# Patient Record
Sex: FEMALE | Race: BLACK OR AFRICAN AMERICAN | Employment: UNEMPLOYED | URBAN - METROPOLITAN AREA
[De-identification: names, ages, dates, MRNs, and addresses within clinical notes are randomized per-mention and may not be internally consistent; named-entity substitution may affect disease eponyms.]

---

## 2018-05-08 ENCOUNTER — OFFICE VISIT (OUTPATIENT)
Dept: INTERNAL MEDICINE CLINIC | Age: 23
End: 2018-05-08

## 2018-05-08 VITALS
SYSTOLIC BLOOD PRESSURE: 100 MMHG | HEIGHT: 66 IN | HEART RATE: 76 BPM | WEIGHT: 230.2 LBS | BODY MASS INDEX: 37 KG/M2 | DIASTOLIC BLOOD PRESSURE: 72 MMHG | TEMPERATURE: 98.2 F

## 2018-05-08 DIAGNOSIS — E11.9 TYPE 2 DIABETES MELLITUS WITHOUT COMPLICATION, WITHOUT LONG-TERM CURRENT USE OF INSULIN (HCC): ICD-10-CM

## 2018-05-08 DIAGNOSIS — R62.50 DEVELOPMENTAL DELAY: ICD-10-CM

## 2018-05-08 DIAGNOSIS — R73.09 ELEVATED GLUCOSE: ICD-10-CM

## 2018-05-08 DIAGNOSIS — I63.10 CEREBROVASCULAR ACCIDENT (CVA) DUE TO EMBOLISM OF PRECEREBRAL ARTERY (HCC): Primary | ICD-10-CM

## 2018-05-08 DIAGNOSIS — Q21.10 ASD (ATRIAL SEPTAL DEFECT): ICD-10-CM

## 2018-05-08 DIAGNOSIS — I47.1 ATRIAL TACHYCARDIA (HCC): ICD-10-CM

## 2018-05-08 DIAGNOSIS — Z13.820 SCREENING FOR OSTEOPOROSIS: ICD-10-CM

## 2018-05-08 DIAGNOSIS — K74.00 LIVER FIBROSIS: ICD-10-CM

## 2018-05-08 DIAGNOSIS — G43.509 PERSISTENT MIGRAINE AURA WITHOUT CEREBRAL INFARCTION AND WITHOUT STATUS MIGRAINOSUS, NOT INTRACTABLE: ICD-10-CM

## 2018-05-08 PROBLEM — I47.19 ATRIAL TACHYCARDIA: Status: ACTIVE | Noted: 2018-05-08

## 2018-05-08 LAB — HBA1C MFR BLD: 8.8 %

## 2018-05-08 PROCEDURE — 99204 OFFICE O/P NEW MOD 45 MIN: CPT | Performed by: INTERNAL MEDICINE

## 2018-05-08 PROCEDURE — 83036 HEMOGLOBIN GLYCOSYLATED A1C: CPT | Performed by: INTERNAL MEDICINE

## 2018-05-08 RX ORDER — PANTOPRAZOLE SODIUM 40 MG/1
TABLET, DELAYED RELEASE ORAL
COMMUNITY
Start: 2018-01-16 | End: 2021-05-28 | Stop reason: SDUPTHER

## 2018-05-08 RX ORDER — LISINOPRIL 5 MG/1
5 TABLET ORAL
COMMUNITY
Start: 2018-01-11 | End: 2018-08-07 | Stop reason: ALTCHOICE

## 2018-05-08 RX ORDER — WARFARIN SODIUM 1 MG/1
6.5 TABLET ORAL DAILY
COMMUNITY
Start: 2018-01-22 | End: 2021-03-09 | Stop reason: SDUPTHER

## 2018-05-08 RX ORDER — METOPROLOL SUCCINATE 25 MG/1
25 TABLET, EXTENDED RELEASE ORAL
COMMUNITY
Start: 2018-01-05 | End: 2021-03-09

## 2018-05-08 RX ORDER — MEDROXYPROGESTERONE ACETATE 150 MG/ML
INJECTION, SUSPENSION INTRAMUSCULAR
COMMUNITY
End: 2018-05-11 | Stop reason: SDUPTHER

## 2018-05-08 RX ORDER — PROPAFENONE HYDROCHLORIDE 225 MG/1
TABLET, FILM COATED ORAL
COMMUNITY
Start: 2018-04-16

## 2018-05-08 RX ORDER — NYSTATIN 100000 [USP'U]/G
POWDER TOPICAL
COMMUNITY
Start: 2017-05-05 | End: 2018-12-13 | Stop reason: SDUPTHER

## 2018-05-08 RX ORDER — SPIRONOLACTONE 25 MG/1
25 TABLET ORAL DAILY
COMMUNITY
Start: 2017-05-05 | End: 2021-03-09 | Stop reason: ALTCHOICE

## 2018-05-08 RX ORDER — AMITRIPTYLINE HYDROCHLORIDE 25 MG/1
50 TABLET, FILM COATED ORAL
COMMUNITY
Start: 2018-04-19 | End: 2019-11-05

## 2018-05-08 ASSESSMENT — ENCOUNTER SYMPTOMS
WHEEZING: 0
CHEST TIGHTNESS: 0
SHORTNESS OF BREATH: 0

## 2018-05-09 LAB
A/G RATIO: 1.6 (ref 1.1–2.2)
ALBUMIN SERPL-MCNC: 4.5 G/DL (ref 3.4–5)
ALP BLD-CCNC: 85 U/L (ref 40–129)
ALT SERPL-CCNC: 28 U/L (ref 10–40)
ANION GAP SERPL CALCULATED.3IONS-SCNC: 19 MMOL/L (ref 3–16)
AST SERPL-CCNC: 23 U/L (ref 15–37)
BILIRUB SERPL-MCNC: 0.4 MG/DL (ref 0–1)
BUN BLDV-MCNC: 14 MG/DL (ref 7–20)
CALCIUM SERPL-MCNC: 9.5 MG/DL (ref 8.3–10.6)
CHLORIDE BLD-SCNC: 95 MMOL/L (ref 99–110)
CHOLESTEROL, TOTAL: 250 MG/DL (ref 0–199)
CO2: 22 MMOL/L (ref 21–32)
CREAT SERPL-MCNC: 0.8 MG/DL (ref 0.6–1.1)
GFR AFRICAN AMERICAN: >60
GFR NON-AFRICAN AMERICAN: >60
GLOBULIN: 2.8 G/DL
GLUCOSE BLD-MCNC: 150 MG/DL (ref 70–99)
HDLC SERPL-MCNC: 32 MG/DL (ref 40–60)
LDL CHOLESTEROL CALCULATED: 180 MG/DL
POTASSIUM SERPL-SCNC: 5.2 MMOL/L (ref 3.5–5.1)
SODIUM BLD-SCNC: 136 MMOL/L (ref 136–145)
TOTAL PROTEIN: 7.3 G/DL (ref 6.4–8.2)
TRIGL SERPL-MCNC: 190 MG/DL (ref 0–150)
TSH REFLEX: 2.72 UIU/ML (ref 0.27–4.2)
VLDLC SERPL CALC-MCNC: 38 MG/DL

## 2018-05-11 RX ORDER — MEDROXYPROGESTERONE ACETATE 150 MG/ML
150 INJECTION, SUSPENSION INTRAMUSCULAR
Qty: 1 ML | Refills: 1 | Status: SHIPPED | OUTPATIENT
Start: 2018-05-11 | End: 2018-07-17 | Stop reason: SDUPTHER

## 2018-05-15 ENCOUNTER — TELEPHONE (OUTPATIENT)
Dept: INTERNAL MEDICINE CLINIC | Age: 23
End: 2018-05-15

## 2018-05-15 DIAGNOSIS — E11.9 TYPE 2 DIABETES MELLITUS WITHOUT COMPLICATION, WITHOUT LONG-TERM CURRENT USE OF INSULIN (HCC): Primary | ICD-10-CM

## 2018-05-15 LAB — POTASSIUM SERPL-SCNC: 4.8 MMOL/L (ref 3.5–5.1)

## 2018-05-25 ENCOUNTER — TELEPHONE (OUTPATIENT)
Dept: INTERNAL MEDICINE CLINIC | Age: 23
End: 2018-05-25

## 2018-05-31 ENCOUNTER — HOSPITAL ENCOUNTER (OUTPATIENT)
Dept: WOMENS IMAGING | Age: 23
Discharge: HOME | End: 2018-05-31
Attending: INTERNAL MEDICINE | Admitting: INTERNAL MEDICINE

## 2018-05-31 DIAGNOSIS — Z13.820 ENCOUNTER FOR SCREENING FOR OSTEOPOROSIS: ICD-10-CM

## 2018-06-01 ENCOUNTER — HOSPITAL ENCOUNTER (OUTPATIENT)
Dept: OTHER | Age: 23
Discharge: HOME | End: 2018-06-30
Attending: INTERNAL MEDICINE | Admitting: INTERNAL MEDICINE

## 2018-06-04 PROBLEM — M85.80 LOW BONE MASS: Status: ACTIVE | Noted: 2018-06-04

## 2018-06-06 ENCOUNTER — TELEPHONE (OUTPATIENT)
Dept: INTERNAL MEDICINE CLINIC | Age: 23
End: 2018-06-06

## 2018-06-12 ENCOUNTER — TELEPHONE (OUTPATIENT)
Dept: INTERNAL MEDICINE CLINIC | Age: 23
End: 2018-06-12

## 2018-06-13 RX ORDER — ATORVASTATIN CALCIUM 20 MG/1
20 TABLET, FILM COATED ORAL DAILY
Qty: 30 TABLET | Refills: 3 | Status: SHIPPED | OUTPATIENT
Start: 2018-06-13 | End: 2018-10-12 | Stop reason: SDUPTHER

## 2018-06-26 ENCOUNTER — TELEPHONE (OUTPATIENT)
Dept: INTERNAL MEDICINE CLINIC | Age: 23
End: 2018-06-26

## 2018-06-26 RX ORDER — PROMETHAZINE HYDROCHLORIDE 25 MG/1
25 TABLET ORAL EVERY 6 HOURS PRN
Qty: 12 TABLET | Refills: 1 | Status: SHIPPED | OUTPATIENT
Start: 2018-06-26 | End: 2018-07-03

## 2018-07-17 RX ORDER — MEDROXYPROGESTERONE ACETATE 150 MG/ML
INJECTION, SUSPENSION INTRAMUSCULAR
Qty: 1 ML | Refills: 3 | Status: SHIPPED | OUTPATIENT
Start: 2018-07-17 | End: 2018-11-14 | Stop reason: ALTCHOICE

## 2018-08-07 ENCOUNTER — OFFICE VISIT (OUTPATIENT)
Dept: INTERNAL MEDICINE CLINIC | Age: 23
End: 2018-08-07

## 2018-08-07 VITALS
TEMPERATURE: 98.1 F | HEIGHT: 66 IN | DIASTOLIC BLOOD PRESSURE: 70 MMHG | BODY MASS INDEX: 37.16 KG/M2 | WEIGHT: 231.2 LBS | HEART RATE: 67 BPM | SYSTOLIC BLOOD PRESSURE: 90 MMHG

## 2018-08-07 DIAGNOSIS — K74.00 LIVER FIBROSIS: ICD-10-CM

## 2018-08-07 DIAGNOSIS — M85.80 LOW BONE MASS: ICD-10-CM

## 2018-08-07 DIAGNOSIS — E11.9 TYPE 2 DIABETES MELLITUS WITHOUT COMPLICATION, WITHOUT LONG-TERM CURRENT USE OF INSULIN (HCC): ICD-10-CM

## 2018-08-07 DIAGNOSIS — Z00.00 ANNUAL PHYSICAL EXAM: Primary | ICD-10-CM

## 2018-08-07 LAB — HBA1C MFR BLD: 6.7 %

## 2018-08-07 PROCEDURE — 99395 PREV VISIT EST AGE 18-39: CPT | Performed by: INTERNAL MEDICINE

## 2018-08-07 PROCEDURE — 83036 HEMOGLOBIN GLYCOSYLATED A1C: CPT | Performed by: INTERNAL MEDICINE

## 2018-08-07 NOTE — PROGRESS NOTES
normal.   Mouth/Throat: No oropharyngeal exudate (scalloped tongue). Neck: No thyromegaly present. Cardiovascular: Normal rate, regular rhythm and intact distal pulses. Murmur (2/6 REDD LSB) heard. Pulmonary/Chest: Effort normal and breath sounds normal. No respiratory distress. She has no wheezes. She has no rales. Abdominal: She exhibits no distension. There is no tenderness. There is no rebound and no guarding. Genitourinary: There is no rash, tenderness, lesion or injury on the right labia. There is no rash, tenderness, lesion or injury on the left labia. Genitourinary Comments: I tried to do speculum exam  Patient pulled away and could not insert even the pediatric speculum     Musculoskeletal: She exhibits no edema. Lymphadenopathy:     She has no cervical adenopathy. Neurological: She is alert and oriented to person, place, and time. Skin: Skin is warm and dry. Assessment:          Diagnosis Orders   1. Annual physical exam     2. Type 2 diabetes mellitus without complication, without long-term current use of insulin ([facility])  POCT glycosylated hemoglobin (Hb A1C)   3. Liver fibrosis ([address]     4.  Low bone mass             Plan:       will forgo pap smear    If cycles become heavy consider IUD  She has not yet resumed her cycles    A 1 c better 6.7   Continue metformin    She sees  for liver disease    The depo discontinued due to low bone mass and diabetes    Return 3 months

## 2018-10-12 RX ORDER — ATORVASTATIN CALCIUM 20 MG/1
TABLET, FILM COATED ORAL
Qty: 30 TABLET | Refills: 2 | Status: SHIPPED | OUTPATIENT
Start: 2018-10-12 | End: 2019-01-16 | Stop reason: SDUPTHER

## 2018-11-14 ENCOUNTER — OFFICE VISIT (OUTPATIENT)
Dept: PRIMARY CARE CLINIC | Age: 23
End: 2018-11-14

## 2018-11-14 VITALS
SYSTOLIC BLOOD PRESSURE: 110 MMHG | BODY MASS INDEX: 37.65 KG/M2 | OXYGEN SATURATION: 98 % | HEART RATE: 88 BPM | DIASTOLIC BLOOD PRESSURE: 76 MMHG | HEIGHT: 65 IN | WEIGHT: 226 LBS | TEMPERATURE: 98.2 F

## 2018-11-14 DIAGNOSIS — Q21.10 ASD (ATRIAL SEPTAL DEFECT): ICD-10-CM

## 2018-11-14 DIAGNOSIS — E11.9 TYPE 2 DIABETES MELLITUS WITHOUT COMPLICATION, WITHOUT LONG-TERM CURRENT USE OF INSULIN (HCC): Primary | ICD-10-CM

## 2018-11-14 DIAGNOSIS — M25.552 HIP PAIN, LEFT: ICD-10-CM

## 2018-11-14 DIAGNOSIS — M85.80 LOW BONE MASS: ICD-10-CM

## 2018-11-14 DIAGNOSIS — K74.00 LIVER FIBROSIS: ICD-10-CM

## 2018-11-14 LAB
ANION GAP SERPL CALCULATED.3IONS-SCNC: 17 MMOL/L (ref 3–16)
BUN BLDV-MCNC: 12 MG/DL (ref 7–20)
CALCIUM SERPL-MCNC: 10.2 MG/DL (ref 8.3–10.6)
CHLORIDE BLD-SCNC: 101 MMOL/L (ref 99–110)
CHOLESTEROL, TOTAL: 152 MG/DL (ref 0–199)
CO2: 20 MMOL/L (ref 21–32)
CREAT SERPL-MCNC: 0.8 MG/DL (ref 0.6–1.1)
GFR AFRICAN AMERICAN: >60
GFR NON-AFRICAN AMERICAN: >60
GLUCOSE BLD-MCNC: 83 MG/DL (ref 70–99)
HBA1C MFR BLD: 6.9 %
HDLC SERPL-MCNC: 35 MG/DL (ref 40–60)
LDL CHOLESTEROL CALCULATED: 81 MG/DL
POTASSIUM SERPL-SCNC: 4.5 MMOL/L (ref 3.5–5.1)
SODIUM BLD-SCNC: 138 MMOL/L (ref 136–145)
TRIGL SERPL-MCNC: 180 MG/DL (ref 0–150)
VLDLC SERPL CALC-MCNC: 36 MG/DL

## 2018-11-14 PROCEDURE — 83036 HEMOGLOBIN GLYCOSYLATED A1C: CPT | Performed by: INTERNAL MEDICINE

## 2018-11-14 PROCEDURE — 99213 OFFICE O/P EST LOW 20 MIN: CPT | Performed by: INTERNAL MEDICINE

## 2018-11-14 ASSESSMENT — PATIENT HEALTH QUESTIONNAIRE - PHQ9
SUM OF ALL RESPONSES TO PHQ QUESTIONS 1-9: 0
SUM OF ALL RESPONSES TO PHQ QUESTIONS 1-9: 0
SUM OF ALL RESPONSES TO PHQ9 QUESTIONS 1 & 2: 0
2. FEELING DOWN, DEPRESSED OR HOPELESS: 0
1. LITTLE INTEREST OR PLEASURE IN DOING THINGS: 0

## 2018-12-13 RX ORDER — NYSTATIN 100000 [USP'U]/G
POWDER TOPICAL
Qty: 2 BOTTLE | Refills: 1 | Status: SHIPPED | OUTPATIENT
Start: 2018-12-13 | End: 2019-04-01 | Stop reason: SDUPTHER

## 2019-01-17 RX ORDER — ATORVASTATIN CALCIUM 20 MG/1
TABLET, FILM COATED ORAL
Qty: 90 TABLET | Refills: 1 | Status: SHIPPED | OUTPATIENT
Start: 2019-01-17 | End: 2019-07-19 | Stop reason: SDUPTHER

## 2019-02-06 ENCOUNTER — TELEPHONE (OUTPATIENT)
Dept: PRIMARY CARE CLINIC | Age: 24
End: 2019-02-06

## 2019-02-22 ENCOUNTER — TELEPHONE (OUTPATIENT)
Dept: PRIMARY CARE CLINIC | Age: 24
End: 2019-02-22

## 2019-02-25 RX ORDER — PROCHLORPERAZINE MALEATE 10 MG
5 TABLET ORAL PRN
COMMUNITY
Start: 2016-09-30 | End: 2022-05-31

## 2019-02-27 DIAGNOSIS — M85.80 LOW BONE MASS: Primary | ICD-10-CM

## 2019-03-29 ENCOUNTER — TELEPHONE (OUTPATIENT)
Dept: PRIMARY CARE CLINIC | Age: 24
End: 2019-03-29

## 2019-03-29 RX ORDER — NYSTATIN 100000 [USP'U]/G
POWDER TOPICAL
Qty: 2 BOTTLE | Refills: 1 | Status: CANCELLED | OUTPATIENT
Start: 2019-03-29

## 2019-04-01 ENCOUNTER — TELEPHONE (OUTPATIENT)
Dept: PRIMARY CARE CLINIC | Age: 24
End: 2019-04-01

## 2019-04-01 RX ORDER — NYSTATIN 100000 [USP'U]/G
POWDER TOPICAL
Qty: 2 BOTTLE | Refills: 1 | Status: SHIPPED | OUTPATIENT
Start: 2019-04-01 | End: 2020-01-30 | Stop reason: SDUPTHER

## 2019-04-09 ENCOUNTER — OFFICE VISIT (OUTPATIENT)
Dept: PRIMARY CARE CLINIC | Age: 24
End: 2019-04-09
Payer: COMMERCIAL

## 2019-04-09 VITALS
WEIGHT: 225 LBS | BODY MASS INDEX: 37.44 KG/M2 | HEART RATE: 76 BPM | SYSTOLIC BLOOD PRESSURE: 106 MMHG | TEMPERATURE: 97.6 F | DIASTOLIC BLOOD PRESSURE: 74 MMHG

## 2019-04-09 DIAGNOSIS — R21 RASH: ICD-10-CM

## 2019-04-09 DIAGNOSIS — Q21.10 ASD (ATRIAL SEPTAL DEFECT): ICD-10-CM

## 2019-04-09 DIAGNOSIS — E11.9 TYPE 2 DIABETES MELLITUS WITHOUT COMPLICATION, WITHOUT LONG-TERM CURRENT USE OF INSULIN (HCC): Primary | ICD-10-CM

## 2019-04-09 DIAGNOSIS — I63.10 CEREBROVASCULAR ACCIDENT (CVA) DUE TO EMBOLISM OF PRECEREBRAL ARTERY (HCC): ICD-10-CM

## 2019-04-09 DIAGNOSIS — E78.5 HYPERLIPIDEMIA, UNSPECIFIED HYPERLIPIDEMIA TYPE: ICD-10-CM

## 2019-04-09 LAB — HBA1C MFR BLD: 6.4 %

## 2019-04-09 PROCEDURE — 83036 HEMOGLOBIN GLYCOSYLATED A1C: CPT | Performed by: INTERNAL MEDICINE

## 2019-04-09 PROCEDURE — G8427 DOCREV CUR MEDS BY ELIG CLIN: HCPCS | Performed by: INTERNAL MEDICINE

## 2019-04-09 PROCEDURE — 1036F TOBACCO NON-USER: CPT | Performed by: INTERNAL MEDICINE

## 2019-04-09 PROCEDURE — 3046F HEMOGLOBIN A1C LEVEL >9.0%: CPT | Performed by: INTERNAL MEDICINE

## 2019-04-09 PROCEDURE — G8417 CALC BMI ABV UP PARAM F/U: HCPCS | Performed by: INTERNAL MEDICINE

## 2019-04-09 PROCEDURE — 2022F DILAT RTA XM EVC RTNOPTHY: CPT | Performed by: INTERNAL MEDICINE

## 2019-04-09 PROCEDURE — 99214 OFFICE O/P EST MOD 30 MIN: CPT | Performed by: INTERNAL MEDICINE

## 2019-04-09 PROCEDURE — G8598 ASA/ANTIPLAT THER USED: HCPCS | Performed by: INTERNAL MEDICINE

## 2019-04-09 NOTE — PROGRESS NOTES
Shakeel Talley  YOB: 1995    Date of Service:  4/9/2019    Chief Complaint:   Shakeel Talley is a 21 y.o. female who presents for  Breast rash and chronic health problems. HPI: patient came here for Breast rash and chronic health problems. Diabetes: stable . Monitoring sugar at home. patient is on metformin . Discussed for low carb diet and good impact of exercise on health. Hyperlipidemia : stable on lipitor. patient denies any side effects of medication . ASD: [facility] is [facility] .      CVA: h/o CVA . Patient  complaints rash under breasts. She was on anti fungal power . Review of Systems:  Constitutional: Negative for chills, fever, malaise/fatigue and weight loss. HENT: Negative for headaches, ear discharge, ear pain, hearing loss, sore throat,   blurry vision and double vision. Respiratory: Negative for cough, hemoptysis, sputum production, shortness of breath and wheezing. Cardiovascular: Negative for chest pain, palpitations, orthopnea, claudication and leg swelling. Gastrointestinal: Negative for abdominal pain, nausea and vomiting, constipation, diarrhea, heartburn, blood in stool, melena. Genitourinary: Negative for dysuria, flank pain, frequency, hematuria and urgency. Musculoskeletal: Negative for back pain, myalgias and neck pain. Neurological: Negative for dizziness, seizure, sensory change, focal weakness, loss of consciousness . Psychiatric/Behavioral: Negative for depression and substance abuse. The patient does not have insomnia. Vitals:    04/09/19 1406   BP: 106/74   Pulse: 76   Temp: 97.6 °F (36.4 °C)     Wt Readings from Last 3 Encounters:   04/09/19 225 lb (102.1 kg)   11/14/18 226 lb (102.5 kg)   08/07/18 231 lb 3.2 oz (104.9 kg)     BP Readings from Last 3 Encounters:   04/09/19 106/74   11/14/18 110/76   08/07/18 90/70         Physical Exam:  Constitutional:   .  No distress. HENT:   Head: Normocephalic. Right Ear: External ear normal.   Left Ear: External ear normal.   Mouth/Throat: Oropharynx is clear and moist. No oropharyngeal exudate. Eyes: Conjunctivae and EOM are normal. Pupils are equal, round, and reactive to light. Right eye exhibits no discharge. Left eye exhibits no discharge. No scleral icterus. Neck: Neck supple. No JVD present. No thyromegaly present. Cardiovascular:  Normal rate, regular rhythm, normal heart sounds and intact distal pulses. Exam reveals no gallop. murmur heard+. no edema. Chest: Effort normal and breath sounds normal. no wheezes. has no rales. Abdominal: old scars+,Soft, Bowel sounds are normal.  exhibits no distension and no mass. No organomegaly  There is no tenderness. There is no guarding. Musculoskeletal: Normal range of motion in all extremities. Lymphadenopathy: No cervical adenopathy. Neurological:  exhibits normal muscle tone. Skin: Rash under both breasts+ . Psychiatric:  alert and oriented to person, place, and time. Normal mood and affect. Immunization History   Administered Date(s) Administered    Influenza Virus Vaccine 11/14/2018       Allergies   Allergen Reactions    Acetaminophen Other (See Comments)     TYLENOL d/t HEPATIC Dz     Current Outpatient Medications   Medication Sig Dispense Refill    nystatin (MYCOSTATIN) 697718 UNIT/GM powder Apply a small amount to the affected area.  2 Bottle 1    prochlorperazine (COMPAZINE) 10 MG tablet Take 5 mg by mouth      atorvastatin (LIPITOR) 20 MG tablet TAKE ONE TABLET BY MOUTH DAILY 90 tablet 1    metFORMIN (GLUCOPHAGE) 1000 MG tablet TAKE ONE TABLET BY MOUTH TWICE A DAY WITH MEALS 60 tablet 1    amitriptyline (ELAVIL) 25 MG tablet Take 50 mg by mouth      metoprolol succinate (TOPROL XL) 25 MG extended release tablet Take 25 mg by mouth      pantoprazole (PROTONIX) 40 MG tablet TAKE ONE TABLET BY MOUTH DAILY      file     Forced sexual activity: Not on file   Other Topics Concern    Not on file   Social History Narrative    Not on file       Assessment/Plan:    Type 2 diabetes mellitus without complication, without long-term current use of insulin ([facility])  Stable  Metformin   Monitor blood sugar at home  Regular exercise and low carb diet.   Eye and foot check        Rash  nystatin powder       Hyperlipidemia, unspecified hyperlipidemia type  Stable  lipitor     ASD (atrial septal defect)  Patient is following  [facility] cardiologist .    Cerebrovascular accident (CVA) due to embolism of precerebral artery [facility])  Patient is following  neurology

## 2019-04-10 ENCOUNTER — TELEPHONE (OUTPATIENT)
Dept: PRIMARY CARE CLINIC | Age: 24
End: 2019-04-10

## 2019-04-10 NOTE — TELEPHONE ENCOUNTER
Pt's mother stated that the pharmacy needs to know how often pt will be taking the nystatin (MYCOSTATIN) 618520 UNIT/GM powder? Pt's mother stated that they will not fill the medication without the information?        [facility] [address], [address] [place] [address] -  [phone]

## 2019-04-10 NOTE — TELEPHONE ENCOUNTER
Spoke with pharmacy tech. She said they have it as powder, they just got it wrong at first.  Informed Dunia Falling that they are getting the powder ready.

## 2019-05-09 ENCOUNTER — OFFICE VISIT (OUTPATIENT)
Dept: ORTHOPEDIC SURGERY | Age: 24
End: 2019-05-09
Payer: COMMERCIAL

## 2019-05-09 VITALS
BODY MASS INDEX: 37.5 KG/M2 | WEIGHT: 225.09 LBS | DIASTOLIC BLOOD PRESSURE: 67 MMHG | HEART RATE: 68 BPM | SYSTOLIC BLOOD PRESSURE: 102 MMHG | HEIGHT: 65 IN

## 2019-05-09 DIAGNOSIS — E11.9 DIABETES MELLITUS WITHOUT COMPLICATION (HCC): ICD-10-CM

## 2019-05-09 DIAGNOSIS — L60.1 ONYCHOLYSIS: Primary | ICD-10-CM

## 2019-05-09 PROCEDURE — 1036F TOBACCO NON-USER: CPT | Performed by: PODIATRIST

## 2019-05-09 PROCEDURE — G8598 ASA/ANTIPLAT THER USED: HCPCS | Performed by: PODIATRIST

## 2019-05-09 PROCEDURE — G8427 DOCREV CUR MEDS BY ELIG CLIN: HCPCS | Performed by: PODIATRIST

## 2019-05-09 PROCEDURE — G8417 CALC BMI ABV UP PARAM F/U: HCPCS | Performed by: PODIATRIST

## 2019-05-09 PROCEDURE — 3044F HG A1C LEVEL LT 7.0%: CPT | Performed by: PODIATRIST

## 2019-05-09 PROCEDURE — 2022F DILAT RTA XM EVC RTNOPTHY: CPT | Performed by: PODIATRIST

## 2019-05-09 PROCEDURE — 99202 OFFICE O/P NEW SF 15 MIN: CPT | Performed by: PODIATRIST

## 2019-05-09 NOTE — PROGRESS NOTES
HISTORY OF PRESENT ILLNESS: This is an initial visit for a MRDD 20-year-old female diabetic who presents for a diabetic foot exam.  She is here with her mother. She was very concerned about a left great toenail fell off about a month ago. She states that she does not have any pain with it. She denies any previous history of foot ulceration or infection. FAMILY HISTORY: Documented in chart. SOCIAL HISTORY: Documented in chart. REVIEW OF SYSTEMS:  The patient denies any issues with dermatologic, pulmonary, cardiovascular, genitourinary, hematologic, gastrointestinal, neurologic, psychiatric, and HEENT systems. Family History, Social History, and Review of Systems were reviewed from patient history form dated on 5/9/2019 and available in the patient's chart under the MEDIA tab. PHYSICAL EXAMINATION:     The patient is alert and orientated x3. The left hallux nail is firmly adhered and dry and stable. No signs of paronychia are noted. She has minimal palpable tenderness to the toe. She has palpable pedal pulses bilateral.    Her sensation is intact to 5.07 g monofilament and multiple plantar sides of both feet. The remainder of the exam is unremarkable. RADIOGRAPHS: None taken      ASSESSMENT: Onycholysis, IDDM without foot complications      PLAN: The patient was educated on the pathology and its treatment options. A diabetic foot exam was performed today which revealed adequate circulation and sensation. This time, no treatment is needed for the left toenail. I informed them that this may occur again. If she develops symptoms then we discussed different treatment options. We discussed proper diabetic foot care. A large portion of this visit was to inform them of potential foot related problems that may arise as a result of diabetic complications.     I recommended once yearly diabetic foot exam.

## 2019-07-19 RX ORDER — ATORVASTATIN CALCIUM 20 MG/1
TABLET, FILM COATED ORAL
Qty: 90 TABLET | Refills: 0 | Status: SHIPPED | OUTPATIENT
Start: 2019-07-19 | End: 2019-10-17 | Stop reason: SDUPTHER

## 2019-10-17 ENCOUNTER — OFFICE VISIT (OUTPATIENT)
Dept: ORTHOPEDIC SURGERY | Age: 24
End: 2019-10-17
Payer: COMMERCIAL

## 2019-10-17 VITALS
DIASTOLIC BLOOD PRESSURE: 70 MMHG | WEIGHT: 225.09 LBS | HEART RATE: 68 BPM | SYSTOLIC BLOOD PRESSURE: 107 MMHG | HEIGHT: 65 IN | BODY MASS INDEX: 37.5 KG/M2

## 2019-10-17 DIAGNOSIS — L60.1 ONYCHOLYSIS: ICD-10-CM

## 2019-10-17 DIAGNOSIS — I73.9 INTERMITTENT CLAUDICATION (HCC): Primary | ICD-10-CM

## 2019-10-17 PROCEDURE — G8417 CALC BMI ABV UP PARAM F/U: HCPCS | Performed by: PODIATRIST

## 2019-10-17 PROCEDURE — 99212 OFFICE O/P EST SF 10 MIN: CPT | Performed by: PODIATRIST

## 2019-10-17 PROCEDURE — 1036F TOBACCO NON-USER: CPT | Performed by: PODIATRIST

## 2019-10-17 PROCEDURE — G8427 DOCREV CUR MEDS BY ELIG CLIN: HCPCS | Performed by: PODIATRIST

## 2019-10-17 PROCEDURE — G8598 ASA/ANTIPLAT THER USED: HCPCS | Performed by: PODIATRIST

## 2019-10-17 PROCEDURE — G8484 FLU IMMUNIZE NO ADMIN: HCPCS | Performed by: PODIATRIST

## 2019-10-17 RX ORDER — ATORVASTATIN CALCIUM 20 MG/1
TABLET, FILM COATED ORAL
Qty: 30 TABLET | Refills: 3 | Status: SHIPPED | OUTPATIENT
Start: 2019-10-17 | End: 2020-02-17

## 2019-10-24 ENCOUNTER — OFFICE VISIT (OUTPATIENT)
Dept: ORTHOPEDIC SURGERY | Age: 24
End: 2019-10-24
Payer: COMMERCIAL

## 2019-10-24 VITALS — WEIGHT: 205 LBS | HEIGHT: 65 IN | BODY MASS INDEX: 34.16 KG/M2

## 2019-10-24 DIAGNOSIS — M79.645 FINGER PAIN, LEFT: Primary | ICD-10-CM

## 2019-10-24 DIAGNOSIS — S69.92XA FINGERNAIL INJURY, LEFT, INITIAL ENCOUNTER: ICD-10-CM

## 2019-10-24 PROCEDURE — 1036F TOBACCO NON-USER: CPT | Performed by: ORTHOPAEDIC SURGERY

## 2019-10-24 PROCEDURE — 99203 OFFICE O/P NEW LOW 30 MIN: CPT | Performed by: ORTHOPAEDIC SURGERY

## 2019-10-24 PROCEDURE — G8598 ASA/ANTIPLAT THER USED: HCPCS | Performed by: ORTHOPAEDIC SURGERY

## 2019-10-24 PROCEDURE — G8417 CALC BMI ABV UP PARAM F/U: HCPCS | Performed by: ORTHOPAEDIC SURGERY

## 2019-10-24 PROCEDURE — G8427 DOCREV CUR MEDS BY ELIG CLIN: HCPCS | Performed by: ORTHOPAEDIC SURGERY

## 2019-10-24 PROCEDURE — G8484 FLU IMMUNIZE NO ADMIN: HCPCS | Performed by: ORTHOPAEDIC SURGERY

## 2019-11-05 ENCOUNTER — OFFICE VISIT (OUTPATIENT)
Dept: PRIMARY CARE CLINIC | Age: 24
End: 2019-11-05
Payer: COMMERCIAL

## 2019-11-05 VITALS
WEIGHT: 212.4 LBS | SYSTOLIC BLOOD PRESSURE: 108 MMHG | DIASTOLIC BLOOD PRESSURE: 68 MMHG | BODY MASS INDEX: 35.35 KG/M2 | HEART RATE: 56 BPM | TEMPERATURE: 98.7 F

## 2019-11-05 DIAGNOSIS — E11.9 TYPE 2 DIABETES MELLITUS WITHOUT COMPLICATION, WITHOUT LONG-TERM CURRENT USE OF INSULIN (HCC): Primary | ICD-10-CM

## 2019-11-05 DIAGNOSIS — I63.10 CEREBROVASCULAR ACCIDENT (CVA) DUE TO EMBOLISM OF PRECEREBRAL ARTERY (HCC): ICD-10-CM

## 2019-11-05 DIAGNOSIS — E78.5 HYPERLIPIDEMIA, UNSPECIFIED HYPERLIPIDEMIA TYPE: ICD-10-CM

## 2019-11-05 DIAGNOSIS — Q21.10 ASD (ATRIAL SEPTAL DEFECT): ICD-10-CM

## 2019-11-05 DIAGNOSIS — Z23 FLU VACCINE NEED: ICD-10-CM

## 2019-11-05 DIAGNOSIS — R07.9 CHEST PAIN, UNSPECIFIED TYPE: ICD-10-CM

## 2019-11-05 LAB
CREATININE URINE: 186.8 MG/DL (ref 28–259)
HBA1C MFR BLD: 6.2 %
MICROALBUMIN UR-MCNC: 2.7 MG/DL
MICROALBUMIN/CREAT UR-RTO: 14.5 MG/G (ref 0–30)

## 2019-11-05 PROCEDURE — G8417 CALC BMI ABV UP PARAM F/U: HCPCS | Performed by: INTERNAL MEDICINE

## 2019-11-05 PROCEDURE — 2022F DILAT RTA XM EVC RTNOPTHY: CPT | Performed by: INTERNAL MEDICINE

## 2019-11-05 PROCEDURE — G8482 FLU IMMUNIZE ORDER/ADMIN: HCPCS | Performed by: INTERNAL MEDICINE

## 2019-11-05 PROCEDURE — G8427 DOCREV CUR MEDS BY ELIG CLIN: HCPCS | Performed by: INTERNAL MEDICINE

## 2019-11-05 PROCEDURE — 99215 OFFICE O/P EST HI 40 MIN: CPT | Performed by: INTERNAL MEDICINE

## 2019-11-05 PROCEDURE — 90686 IIV4 VACC NO PRSV 0.5 ML IM: CPT | Performed by: INTERNAL MEDICINE

## 2019-11-05 PROCEDURE — G8598 ASA/ANTIPLAT THER USED: HCPCS | Performed by: INTERNAL MEDICINE

## 2019-11-05 PROCEDURE — 90471 IMMUNIZATION ADMIN: CPT | Performed by: INTERNAL MEDICINE

## 2019-11-05 PROCEDURE — 1036F TOBACCO NON-USER: CPT | Performed by: INTERNAL MEDICINE

## 2019-11-05 PROCEDURE — 83036 HEMOGLOBIN GLYCOSYLATED A1C: CPT | Performed by: INTERNAL MEDICINE

## 2019-11-05 PROCEDURE — 3044F HG A1C LEVEL LT 7.0%: CPT | Performed by: INTERNAL MEDICINE

## 2019-11-06 ENCOUNTER — HOSPITAL ENCOUNTER (OUTPATIENT)
Dept: VASCULAR LAB | Age: 24
Discharge: HOME | End: 2019-11-06
Payer: COMMERCIAL

## 2019-11-06 DIAGNOSIS — I73.9 INTERMITTENT CLAUDICATION (HCC): Primary | ICD-10-CM

## 2019-11-06 PROCEDURE — 93923 UPR/LXTR ART STDY 3+ LVLS: CPT

## 2020-01-30 ENCOUNTER — OFFICE VISIT (OUTPATIENT)
Dept: ORTHOPEDIC SURGERY | Age: 25
End: 2020-01-30
Payer: COMMERCIAL

## 2020-01-30 VITALS — RESPIRATION RATE: 16 BRPM | HEIGHT: 65 IN | BODY MASS INDEX: 35.37 KG/M2 | WEIGHT: 212.3 LBS

## 2020-01-30 PROCEDURE — 11730 AVULSION NAIL PLATE SIMPLE 1: CPT | Performed by: PODIATRIST

## 2020-01-30 PROCEDURE — G8482 FLU IMMUNIZE ORDER/ADMIN: HCPCS | Performed by: PODIATRIST

## 2020-01-30 PROCEDURE — 1036F TOBACCO NON-USER: CPT | Performed by: PODIATRIST

## 2020-01-30 PROCEDURE — G8427 DOCREV CUR MEDS BY ELIG CLIN: HCPCS | Performed by: PODIATRIST

## 2020-01-30 PROCEDURE — G8417 CALC BMI ABV UP PARAM F/U: HCPCS | Performed by: PODIATRIST

## 2020-01-30 PROCEDURE — 99212 OFFICE O/P EST SF 10 MIN: CPT | Performed by: PODIATRIST

## 2020-01-30 RX ORDER — NYSTATIN 100000 [USP'U]/G
POWDER TOPICAL
Qty: 2 BOTTLE | Refills: 1 | Status: SHIPPED | OUTPATIENT
Start: 2020-01-30 | End: 2021-03-30 | Stop reason: SDUPTHER

## 2020-01-30 NOTE — PROGRESS NOTES
HISTORY OF PRESENT ILLNESS: This is a return visit for a patient with a painful ingrown and infected  toenail on the left great toe. Her circulation test returned demonstrating normal flow to the toes. She continues having pain to the left great toe. PHYSICAL EXAMINATION: The lateral nail fold of the left hallux is  Severely erythematous, edematous, and painful to palpation. No  ascending cellulitis is present. Granuloma is present at the lateral nail fold. The patient demonstrates nonpalpable pedal pulses  and sensation that is grossly intact. ASSESSMENT: Paronychia lateral border of the left hallux. PLAN: The procedure of a proximal nail avulsion was discussed with the patient and her mother. There is no guarantee of a good cosmetic appearance of the nail afterwards. I have answered all of the patient's questions regarding the procedure. The patient verbally acknowledges the potential complications of infection, pain, bleeding, and/or abnormal appearance of the nail as it regrows. Under local anesthesia (5 mL of a 50-50 mixture of 1% Xylocaine plain and 0.5% Marcaine plain), the lateral border of the left hallux nail was avulsed from the nail bed in its entirety. A tourniquet was used to control bleeding. The nailbed was irrigated and cleansed. A compressive dressing was applied to the digit. Postop instructions were given to the patient. I will followup with them in 2 weeks.

## 2020-01-31 NOTE — TELEPHONE ENCOUNTER
[facility]'S NYSTATIN.       HE SAYS HE  NEEDS TO KNOW HOW OFTEN TO APPLY

## 2020-02-05 ENCOUNTER — OFFICE VISIT (OUTPATIENT)
Dept: PRIMARY CARE CLINIC | Age: 25
End: 2020-02-05
Payer: COMMERCIAL

## 2020-02-05 VITALS — OXYGEN SATURATION: 93 % | BODY MASS INDEX: 34.78 KG/M2 | WEIGHT: 209 LBS | TEMPERATURE: 98.1 F | HEART RATE: 69 BPM

## 2020-02-05 LAB — HBA1C MFR BLD: 6.1 %

## 2020-02-05 PROCEDURE — 1036F TOBACCO NON-USER: CPT | Performed by: INTERNAL MEDICINE

## 2020-02-05 PROCEDURE — G8482 FLU IMMUNIZE ORDER/ADMIN: HCPCS | Performed by: INTERNAL MEDICINE

## 2020-02-05 PROCEDURE — 2022F DILAT RTA XM EVC RTNOPTHY: CPT | Performed by: INTERNAL MEDICINE

## 2020-02-05 PROCEDURE — 99214 OFFICE O/P EST MOD 30 MIN: CPT | Performed by: INTERNAL MEDICINE

## 2020-02-05 PROCEDURE — G8417 CALC BMI ABV UP PARAM F/U: HCPCS | Performed by: INTERNAL MEDICINE

## 2020-02-05 PROCEDURE — G8427 DOCREV CUR MEDS BY ELIG CLIN: HCPCS | Performed by: INTERNAL MEDICINE

## 2020-02-05 PROCEDURE — 3044F HG A1C LEVEL LT 7.0%: CPT | Performed by: INTERNAL MEDICINE

## 2020-02-05 PROCEDURE — 83036 HEMOGLOBIN GLYCOSYLATED A1C: CPT | Performed by: INTERNAL MEDICINE

## 2020-02-05 NOTE — PROGRESS NOTES
Mouth/Throat: Oropharynx is clear and moist. No oropharyngeal exudate. Eyes: Conjunctivae and EOM are normal. Pupils are equal, round, and reactive to light. Right eye exhibits no discharge. Left eye exhibits no discharge. No scleral icterus. Neck: Neck supple. No JVD present. No thyromegaly present. Cardiovascular:  Normal rate, regular rhythm, normal heart sounds and intact distal pulses. Exam reveals no gallop. murmur heard+. no edema. Chest: Effort normal and breath sounds normal. no wheezes. has no rales. Abdominal: old scars+,Soft, Bowel sounds are normal.  exhibits no distension and no mass. No organomegaly  There is no tenderness. There is no guarding. Musculoskeletal: Normal range of motion in all extremities. Lymphadenopathy: No cervical adenopathy. Neurological:  exhibits normal muscle tone. Skin: No rash  Psychiatric:  alert and oriented to person, place, and time. Normal mood and affect. Immunization History   Administered Date(s) Administered    Influenza Virus Vaccine 11/14/2018    Influenza, Quadv, IM, PF (6 mo and older Fluzone, Flulaval, Fluarix, and 3 yrs and older Afluria) 11/05/2019       Allergies   Allergen Reactions    Acetaminophen Other (See Comments)     TYLENOL d/t HEPATIC Dz     Current Outpatient Medications   Medication Sig Dispense Refill    nystatin (MYCOSTATIN) 274512 UNIT/GM powder Apply a small amount to the affected area.  2 Bottle 1    metFORMIN (GLUCOPHAGE) 1000 MG tablet TAKE ONE TABLET BY MOUTH TWICE A DAY WITH MEALS 60 tablet 5    atorvastatin (LIPITOR) 20 MG tablet TAKE ONE TABLET BY MOUTH DAILY 30 tablet 3    prochlorperazine (COMPAZINE) 10 MG tablet Take 5 mg by mouth      metoprolol succinate (TOPROL XL) 25 MG extended release tablet Take 25 mg by mouth      pantoprazole (PROTONIX) 40 MG tablet TAKE ONE TABLET BY MOUTH DAILY      propafenone (RYTHMOL) 225 MG tablet TAKE ONE TABLET BY MOUTH EVERY 8 HOURS      spironolactone (ALDACTONE) 25 MG tablet Take 25 mg by mouth      warfarin (COUMADIN) 1 MG tablet Take 4.5mg (1x 4mg tab with 0.5 x 1mg tab) by mouth daily       No current facility-administered medications for this visit.         Past Medical History:   Diagnosis Date    [facility]re[facility]     GERD (gastroesophageal reflux disease)     Migraines      Past Surgical History:   Procedure Laterality Date    CARDIAC SURGERY  02/03/2017    Fontan fenestration closure      Family History   Problem Relation Age of Onset    Other Mother         fibro     Diabetes Father     Heart Disease Father     Other Brother         Fede[place]     Social History     Socioeconomic History    Marital status: Single     Spouse name: Not on file    Number of children: Not on file    Years of education: Not on file    Highest education level: Not on file   Occupational History    Not on file   Social Needs    Financial resource strain: Not on file    Food insecurity:     Worry: Not on file     Inability: Not on file    Transportation needs:     Medical: Not on file     Non-medical: Not on file   Tobacco Use    Smoking status: Never Smoker    Smokeless tobacco: Never Used   Substance and Sexual Activity    Alcohol use: No    Drug use: No    Sexual activity: Never   Lifestyle    Physical activity:     Days per week: Not on file     Minutes per session: Not on file    Stress: Not on file   Relationships    Social connections:     Talks on phone: Not on file     Gets together: Not on file     Attends [employer] service: Not on file     Active member of club or organization: Not on file     Attends meetings of clubs or organizations: Not on file     Relationship status: Not on file    Intimate partner violence:     Fear of current or ex partner: Not on file     Emotionally abused: Not on file     Physically abused: Not on file     Forced sexual activity: Not on file   Other Topics Concern    Not on

## 2020-02-17 RX ORDER — ATORVASTATIN CALCIUM 20 MG/1
TABLET, FILM COATED ORAL
Qty: 30 TABLET | Refills: 5 | Status: SHIPPED | OUTPATIENT
Start: 2020-02-17 | End: 2020-08-24 | Stop reason: SDUPTHER

## 2020-02-18 ENCOUNTER — OFFICE VISIT (OUTPATIENT)
Dept: PRIMARY CARE CLINIC | Age: 25
End: 2020-02-18
Payer: COMMERCIAL

## 2020-02-18 VITALS
HEART RATE: 78 BPM | DIASTOLIC BLOOD PRESSURE: 72 MMHG | TEMPERATURE: 97.5 F | WEIGHT: 206.8 LBS | BODY MASS INDEX: 34.41 KG/M2 | SYSTOLIC BLOOD PRESSURE: 108 MMHG | OXYGEN SATURATION: 92 %

## 2020-02-18 LAB — S PYO AG THROAT QL: NORMAL

## 2020-02-18 PROCEDURE — G8417 CALC BMI ABV UP PARAM F/U: HCPCS | Performed by: INTERNAL MEDICINE

## 2020-02-18 PROCEDURE — 1036F TOBACCO NON-USER: CPT | Performed by: INTERNAL MEDICINE

## 2020-02-18 PROCEDURE — G8427 DOCREV CUR MEDS BY ELIG CLIN: HCPCS | Performed by: INTERNAL MEDICINE

## 2020-02-18 PROCEDURE — G8482 FLU IMMUNIZE ORDER/ADMIN: HCPCS | Performed by: INTERNAL MEDICINE

## 2020-02-18 PROCEDURE — 87880 STREP A ASSAY W/OPTIC: CPT | Performed by: INTERNAL MEDICINE

## 2020-02-18 PROCEDURE — 99213 OFFICE O/P EST LOW 20 MIN: CPT | Performed by: INTERNAL MEDICINE

## 2020-02-18 RX ORDER — AMOXICILLIN AND CLAVULANATE POTASSIUM 875; 125 MG/1; MG/1
1 TABLET, FILM COATED ORAL 2 TIMES DAILY
Qty: 20 TABLET | Refills: 0 | Status: SHIPPED | OUTPATIENT
Start: 2020-02-18 | End: 2020-02-28

## 2020-02-18 NOTE — PROGRESS NOTES
extended release tablet Take 25 mg by mouth      pantoprazole (PROTONIX) 40 MG tablet TAKE ONE TABLET BY MOUTH DAILY      propafenone (RYTHMOL) 225 MG tablet TAKE ONE TABLET BY MOUTH EVERY 8 HOURS      spironolactone (ALDACTONE) 25 MG tablet Take 25 mg by mouth      warfarin (COUMADIN) 1 MG tablet Take 4.5mg (1x 4mg tab with 0.5 x 1mg tab) by mouth daily       No current facility-administered medications for this visit.         Past Medical History:   Diagnosis Date    [facility]A [facility]cerebral vascular accident) ([facility]     GERD (gastroesophageal reflux disease)     Migraines      Past Surgical History:   Procedure Laterality Date    CARDIAC SURGERY  02/03/2017    Fontan fenestration closure      Family History   Problem Relation Age of Onset    Other Mother         fibro     Diabetes Father     Heart Disease Father     Other Brother         [place]     Social History     Socioeconomic History    Marital status: Single     Spouse name: Not on file    Number of children: Not on file    Years of education: Not on file    Highest education level: Not on file   Occupational History    Not on file   Social Needs    Financial resource strain: Not on file    Food insecurity:     Worry: Not on file     Inability: Not on file    Transportation needs:     Medical: Not on file     Non-medical: Not on file   Tobacco Use    Smoking status: Never Smoker    Smokeless tobacco: Never Used   Substance and Sexual Activity    Alcohol use: No    Drug use: No    Sexual activity: Never   Lifestyle    Physical activity:     Days per week: Not on file     Minutes per session: Not on file    Stress: Not on file   Relationships    Social connections:     Talks on phone: Not on file     Gets together: Not on file     Attends [employer] service: Not on file     Active member of club or organization: Not on file     Attends meetings of clubs or organizations: Not on file     Relationship status: Not on file    Intimate partner

## 2020-05-20 ENCOUNTER — VIRTUAL VISIT (OUTPATIENT)
Dept: PRIMARY CARE CLINIC | Age: 25
End: 2020-05-20
Payer: COMMERCIAL

## 2020-05-20 PROBLEM — Z87.74 HISTORY OF REPAIR OF ATRIAL SEPTAL DEFECT: Status: ACTIVE | Noted: 2020-05-20

## 2020-05-20 PROBLEM — M85.80 OSTEOPENIA: Status: ACTIVE | Noted: 2020-05-20

## 2020-05-20 PROBLEM — Z86.73 HISTORY OF CVA (CEREBROVASCULAR ACCIDENT): Status: ACTIVE | Noted: 2020-05-20

## 2020-05-20 PROBLEM — G43.009 MIGRAINE WITHOUT AURA AND WITHOUT STATUS MIGRAINOSUS, NOT INTRACTABLE: Status: ACTIVE | Noted: 2017-01-12

## 2020-05-20 PROCEDURE — 2022F DILAT RTA XM EVC RTNOPTHY: CPT | Performed by: FAMILY MEDICINE

## 2020-05-20 PROCEDURE — G8427 DOCREV CUR MEDS BY ELIG CLIN: HCPCS | Performed by: FAMILY MEDICINE

## 2020-05-20 PROCEDURE — 99214 OFFICE O/P EST MOD 30 MIN: CPT | Performed by: FAMILY MEDICINE

## 2020-05-20 PROCEDURE — 3044F HG A1C LEVEL LT 7.0%: CPT | Performed by: FAMILY MEDICINE

## 2020-05-20 ASSESSMENT — ENCOUNTER SYMPTOMS
EYE ITCHING: 0
CONSTIPATION: 0
EYE PAIN: 0
SHORTNESS OF BREATH: 0
ABDOMINAL PAIN: 0
DIARRHEA: 0
VOMITING: 0
WHEEZING: 0
COUGH: 0
NAUSEA: 0
SORE THROAT: 0

## 2020-05-20 NOTE — PROGRESS NOTES
Due to this being a TeleHealth encounter (During [facility]-17 public health emergency), evaluation of the following organ systems was limited: Vitals/Constitutional/EENT/Resp/CV/GI//MS/Neuro/Skin/Heme-Lymph-Imm. Pursuant to the emergency declaration under the [address] [place] [address], [address] [place] [address] and the [employer] and [employer] Act, this Virtual Visit was conducted with patient's (and/or legal guardian's) consent, to reduce the patient's risk of exposure to COVID-19 and provide necessary medical care. The patient (and/or legal guardian) has also been advised to contact this office for worsening conditions or problems, and seek emergency medical treatment and/or call 911 if deemed necessary. Please note that this chart was generated using dragon dictation software. Although every effort was made to ensure the accuracy of this automated transcription, some errors in transcription may have occurred.

## 2020-08-24 RX ORDER — ATORVASTATIN CALCIUM 20 MG/1
TABLET, FILM COATED ORAL
Qty: 30 TABLET | Refills: 5 | Status: SHIPPED | OUTPATIENT
Start: 2020-08-24 | End: 2021-02-23

## 2021-02-23 RX ORDER — ATORVASTATIN CALCIUM 20 MG/1
TABLET, FILM COATED ORAL
Qty: 30 TABLET | Refills: 1 | Status: SHIPPED | OUTPATIENT
Start: 2021-02-23 | End: 2021-04-20

## 2021-02-23 NOTE — TELEPHONE ENCOUNTER
2 months worth of medication refilled.   Patient needs to be scheduled for an office annual physical.

## 2021-03-09 ENCOUNTER — OFFICE VISIT (OUTPATIENT)
Dept: PRIMARY CARE CLINIC | Age: 26
End: 2021-03-09
Payer: MEDICAID

## 2021-03-09 VITALS
TEMPERATURE: 97.3 F | HEIGHT: 65 IN | BODY MASS INDEX: 35.65 KG/M2 | WEIGHT: 214 LBS | HEART RATE: 76 BPM | SYSTOLIC BLOOD PRESSURE: 98 MMHG | DIASTOLIC BLOOD PRESSURE: 62 MMHG | OXYGEN SATURATION: 90 %

## 2021-03-09 DIAGNOSIS — K74.00 FIBROSIS OF LIVER: ICD-10-CM

## 2021-03-09 DIAGNOSIS — E11.9 TYPE 2 DIABETES MELLITUS WITHOUT COMPLICATION, WITHOUT LONG-TERM CURRENT USE OF INSULIN (HCC): ICD-10-CM

## 2021-03-09 DIAGNOSIS — Z00.00 ANNUAL PHYSICAL EXAM: Primary | ICD-10-CM

## 2021-03-09 DIAGNOSIS — E66.09 CLASS 1 OBESITY DUE TO EXCESS CALORIES WITH SERIOUS COMORBIDITY AND BODY MASS INDEX (BMI) OF 34.0 TO 34.9 IN ADULT: ICD-10-CM

## 2021-03-09 DIAGNOSIS — Z86.73 HISTORY OF CVA (CEREBROVASCULAR ACCIDENT): ICD-10-CM

## 2021-03-09 PROCEDURE — 99395 PREV VISIT EST AGE 18-39: CPT | Performed by: FAMILY MEDICINE

## 2021-03-09 PROCEDURE — G8482 FLU IMMUNIZE ORDER/ADMIN: HCPCS | Performed by: FAMILY MEDICINE

## 2021-03-09 RX ORDER — WARFARIN SODIUM 1 MG/1
TABLET ORAL
COMMUNITY
Start: 2020-11-17

## 2021-03-09 ASSESSMENT — ENCOUNTER SYMPTOMS
DIARRHEA: 0
COUGH: 0
NAUSEA: 0
VOMITING: 0
SHORTNESS OF BREATH: 0
EYE PAIN: 0
SORE THROAT: 0
WHEEZING: 0
CONSTIPATION: 0
EYE ITCHING: 0
ABDOMINAL PAIN: 0

## 2021-03-09 ASSESSMENT — PATIENT HEALTH QUESTIONNAIRE - PHQ9
SUM OF ALL RESPONSES TO PHQ9 QUESTIONS 1 & 2: 0
1. LITTLE INTEREST OR PLEASURE IN DOING THINGS: 0
SUM OF ALL RESPONSES TO PHQ QUESTIONS 1-9: 0

## 2021-03-09 NOTE — PROGRESS NOTES
[address] [facility] [address] PRIMARY CARE  [address] [place] [id number]  Dept: [phone]  Dept Fax: [phone]     3/9/2021      Kulwant Alberto   1995     Chief Complaint   Patient presents with    Annual Exam       HPI  Pt comes in today for first face-to-face visit, completing annual physical.  Patient is accompanied by her mother. She is doing well overall. Patient recently had follow-up and extensive labs/testing done with her specialist at [facility]. Chart review completed in regards to these findings. She does not have any acute concerns or questions. Below is an excerpt from her cardiologist's last note:    RECOMMENDATIONS:  - Strongly encourage daily 30 mins of moderate aerobic activity. It is not only helpful for overall health, but also is beneficial for Fontan health. - Get in to see a dentist. Reduce endocarditis risk (bacterial infection of the heart) by maintaining excellent dental and oral hygiene  - Please reschedule your liver MRI. If her liver stiffness remains elevated, we could consider adding some diuretic to her medication regimen. - At the next visit we will do a Holter monitor, ECG and echocardiogram.  - Please go to [place] to register for COVID vaccine and find more information. Medications:   - No change to medications. Endocarditis prophylaxis:   - Antibiotics are not required for dental procedures. - Reduce endocarditis risk by maintaining excellent dental and oral hygiene  - Do not ignore a fever with no known cause (i.e. isolated fever without recent ill symptoms), especially if it persists - that requires an evaluation by a doctor and blood cultures to rule out a blood stream infection that could affect your heart (endocarditis). Activities/Exercise:   - No restrictions. - We encourage 30 min of aerobic activity at least 3-4 times per wk.     Education/Other Plan:  - Anatomy: discussed your underlying anatomy and post-surgical changes as well as long-term risks  - Obtain yearly influenza vaccination.  - Obtain a pneumococcal vaccine (PPSV) once before 72years old and once after age 72years old (with the 2 doses at least 5 years apart). Emergency Care:  - For concerns related to your congenital heart defect, call us at [phone] if you are not sure it is an emergency. If you need emergency care, go to [facility]    Follow up:   - Return to the [facility] clinic in six months to see Dr. Radha Warner. At that time, we will perform a Holter monitor, ECG, echocardiogram and labs. PHQ Scores 3/9/2021 11/14/2018   PHQ2 Score 0 0   PHQ9 Score 0 0     Interpretation of Total Score Depression Severity: 1-4 = Minimal depression, 5-9 = Mild depression, 10-14 = Moderate depression, 15-19 = Moderately severe depression, 20-27 = Severe depression     Prior to Visit Medications    Medication Sig Taking? Authorizing Provider   warfarin (COUMADIN) 1 MG tablet Take 7mg daily PO (one 5mg tab + two 1mg tabs) Yes Historical Provider, MD   atorvastatin (LIPITOR) 20 MG tablet TAKE ONE TABLET BY MOUTH DAILY Yes Phyliss Agent, DO   metFORMIN (GLUCOPHAGE) 1000 MG tablet Take 0.5 tablets by mouth 2 times daily (with meals) Yes Ro Calvillo, DO   nystatin (MYCOSTATIN) 729897 UNIT/GM powder Apply a small amount to the affected area.  Yes Alma Du MD   prochlorperazine (COMPAZINE) 10 MG tablet Take 5 mg by mouth as needed for Headaches Yes Historical Provider, MD   pantoprazole (PROTONIX) 40 MG tablet TAKE ONE TABLET BY MOUTH DAILY Yes Historical Provider, MD   propafenone (RYTHMOL) 225 MG tablet TAKE ONE TABLET BY MOUTH EVERY 8 HOURS Yes Historical Provider, MD   metoprolol succinate (TOPROL XL) 25 MG extended release tablet Take 25 mg by mouth  Historical Provider, MD   spironolactone (ALDACTONE) 25 MG tablet Take 25 mg by mouth daily  Historical Provider, MD       Past Medical History: Diagnosis Date    Atrial tachycardia ([facility] 5/8/2018    CVA (cerebral vascular accident) ([facility]     Migraines     Type 2 diabetes mellitus without complication (HCC)         Social History     Tobacco Use    Smoking status: Never Smoker    Smokeless tobacco: Never Used   Substance Use Topics    Alcohol use: No    Drug use: No        Past Surgical History:   Procedure Laterality Date    CARDIAC SURGERY  02/03/2017    Fontan fenestration closure         Allergies   Allergen Reactions    Acetaminophen Other (See Comments)     TYLENOL d/t HEPATIC Dz        Family History   Problem Relation Age of Onset    Other Mother         Sarcoidosis    Diabetes Mother     High Blood Pressure Mother     Diabetes Father     Heart Disease Father     Other Brother         Klinefelters        Patient's past medical history, surgical history, family history, medications, and allergies  were all reviewed and updated as appropriate today. Review of Systems   Constitutional: Negative for fatigue, fever and unexpected weight change. HENT: Negative for congestion, ear pain and sore throat. Eyes: Negative for pain, itching and visual disturbance. Respiratory: Negative for cough, shortness of breath and wheezing. Cardiovascular: Negative for chest pain, palpitations and leg swelling. Gastrointestinal: Negative for abdominal pain, constipation, diarrhea, nausea and vomiting. Endocrine: Negative for cold intolerance, heat intolerance, polydipsia and polyuria. Genitourinary: Negative for dysuria, frequency and hematuria. Musculoskeletal: Negative for arthralgias and joint swelling. Skin: Negative for rash. Neurological: Negative for dizziness and headaches. BP 98/62   Pulse 76   Temp 97.3 °F (36.3 °C)   Ht 5' 5\" (1.651 m)   Wt 214 lb (97.1 kg)   LMP 02/17/2021 (Approximate)   SpO2 90%   BMI 35.61 kg/m²      Physical Exam  Vitals signs reviewed.    Constitutional:       General: She is not in acute (cerebrovascular accident) due to embolism of precerebral artery        Plan:  1. Annual physical exam  General wellness exam. Reviewed chart for past hx and updated today. Counseled on age appropriate health guidance and discussed screening recommendations. Vaccinations reviewed and discussed. All questions answered. Of note, patient has never had cervical cancer screening completed. There was apparently an attempt a number of years ago with her previous PCP, but it was too uncomfortable. Patient has never been sexually active, low risk for cervical cancer. Refuses consideration to get this done again at this time. 2. Type 2 diabetes mellitus without complication, without long-term current use of insulin (HCC)  Chronic, previously well controlled. No symptoms suggestive of elevated sugars. Orders provided to have her completed with her next lab draw at [facility]. - Hemoglobin A1C; Future  - Microalbumin / Creatinine Urine Ratio; Future    3. Fibrosis of liver  Stable, reviewed MRI from specialist.  No new recommendations. 4. Class 1 obesity due to excess calories with serious comorbidity and body mass index (BMI) of 34.0 to 34.9 in adult  Continue to encourage lifestyle modifications, mainly increase in activity. Reviewed the recommendations per her cardiologist and discussed these as well. 5. History of CVA (cerebrovascular accident) due to embolism of precerebral artery  - Lipid, Fasting; Future      Return in about 1 year (around 3/9/2022) for Annual Physical.               Belia Lanier, DO     Please note that this chart was generated using dragon dictation software. Although every effort was made to ensure the accuracy of this automated transcription, some errors in transcription may have occurred.

## 2021-03-30 ENCOUNTER — TELEPHONE (OUTPATIENT)
Dept: PRIMARY CARE CLINIC | Age: 26
End: 2021-03-30

## 2021-03-30 RX ORDER — NYSTATIN 100000 [USP'U]/G
POWDER TOPICAL
Qty: 2 BOTTLE | Refills: 1 | Status: SHIPPED | OUTPATIENT
Start: 2021-03-30 | End: 2021-08-17

## 2021-03-30 NOTE — TELEPHONE ENCOUNTER
Savannah Davis 112 CALLING FOR FREQUENCY AND DIRECTIONS FOR THE NYSTATIN POWDER    SARAH AWARE AT THIS HOUR THIS MAY BE ADDRESSED TOMORROW AND THAT IS FINE

## 2021-04-05 DIAGNOSIS — E11.9 TYPE 2 DIABETES MELLITUS WITHOUT COMPLICATION, WITHOUT LONG-TERM CURRENT USE OF INSULIN (HCC): ICD-10-CM

## 2021-04-05 NOTE — TELEPHONE ENCOUNTER
Pharmacy faxed requesting Metformin. They are asking for Metformin 1,000 mg one tab BID  Our chart reads 1,000 mg 1/2 tab BID.   PLEASE REVIEW  rx pended as our chart has it  LAST SEEN       NEXT APPOINTMENT       LAST FILL    3.9.21  None   6.21.20

## 2021-04-06 ENCOUNTER — TELEPHONE (OUTPATIENT)
Dept: PRIMARY CARE CLINIC | Age: 26
End: 2021-04-06

## 2021-04-06 NOTE — TELEPHONE ENCOUNTER
Can you call and remind patient she has labs overdue that she still needs to get done at her earliest convenience, please. Labs should be fasting. Thanks!

## 2021-04-20 RX ORDER — ATORVASTATIN CALCIUM 20 MG/1
TABLET, FILM COATED ORAL
Qty: 30 TABLET | Refills: 5 | Status: SHIPPED | OUTPATIENT
Start: 2021-04-20 | End: 2021-10-22

## 2021-05-28 RX ORDER — PANTOPRAZOLE SODIUM 40 MG/1
TABLET, DELAYED RELEASE ORAL
Qty: 90 TABLET | Refills: 3 | Status: SHIPPED | OUTPATIENT
Start: 2021-05-28 | End: 2022-05-31 | Stop reason: SDUPTHER

## 2021-05-28 NOTE — TELEPHONE ENCOUNTER
Mom was in office today. Asked that Aleidaa's protonix be refilled.   LAST SEEN       NEXT APPOINTMENT            3.9.21  None   Rx pended

## 2021-07-20 LAB
CHOLESTEROL, TOTAL: 128 MG/DL
HDLC SERPL-MCNC: 39 MG/DL (ref 35–70)
LDL CHOLESTEROL DIRECT: 65 MG/DL

## 2021-08-17 RX ORDER — NYSTATIN 100000 [USP'U]/G
POWDER TOPICAL
Qty: 60 G | Refills: 1 | Status: SHIPPED | OUTPATIENT
Start: 2021-08-17 | End: 2022-05-31 | Stop reason: ALTCHOICE

## 2021-10-05 DIAGNOSIS — E11.9 TYPE 2 DIABETES MELLITUS WITHOUT COMPLICATION, WITHOUT LONG-TERM CURRENT USE OF INSULIN (HCC): ICD-10-CM

## 2021-10-22 RX ORDER — ATORVASTATIN CALCIUM 20 MG/1
TABLET, FILM COATED ORAL
Qty: 30 TABLET | Refills: 5 | Status: SHIPPED | OUTPATIENT
Start: 2021-10-22 | End: 2022-04-18

## 2022-01-18 LAB
AVERAGE GLUCOSE: NORMAL
HBA1C MFR BLD: 6.1 %

## 2022-01-20 ENCOUNTER — TELEPHONE (OUTPATIENT)
Dept: PRIMARY CARE CLINIC | Age: 27
End: 2022-01-20

## 2022-01-20 NOTE — TELEPHONE ENCOUNTER
Pt's mother calling saying pt was at [address] 1-18-22 for her cardiology appt. She says afterward they went to lunch then to the [facility] [place] to update her I.d. When leaving the [address] pt fell having a major seizure which she has never had before. 911 was called [facility] and [facility] could not find a reason for the seizures but pt did break her ankle in 3 places and will need plates and screws    Mother just wanted  to know.   She says pt will follow up with all the specialists and [facility] to

## 2022-04-05 DIAGNOSIS — E11.9 TYPE 2 DIABETES MELLITUS WITHOUT COMPLICATION, WITHOUT LONG-TERM CURRENT USE OF INSULIN (HCC): ICD-10-CM

## 2022-04-19 DIAGNOSIS — E11.9 TYPE 2 DIABETES MELLITUS WITHOUT COMPLICATION, WITHOUT LONG-TERM CURRENT USE OF INSULIN (HCC): ICD-10-CM

## 2022-04-19 RX ORDER — ATORVASTATIN CALCIUM 20 MG/1
TABLET, FILM COATED ORAL
Qty: 30 TABLET | Refills: 5 | Status: SHIPPED | OUTPATIENT
Start: 2022-04-19 | End: 2022-10-17

## 2022-05-31 ENCOUNTER — OFFICE VISIT (OUTPATIENT)
Dept: PRIMARY CARE CLINIC | Age: 27
End: 2022-05-31
Payer: MEDICAID

## 2022-05-31 VITALS
OXYGEN SATURATION: 92 % | HEART RATE: 61 BPM | BODY MASS INDEX: 33.99 KG/M2 | WEIGHT: 204 LBS | HEIGHT: 65 IN | SYSTOLIC BLOOD PRESSURE: 100 MMHG | TEMPERATURE: 97 F | DIASTOLIC BLOOD PRESSURE: 64 MMHG

## 2022-05-31 DIAGNOSIS — Z87.74 HISTORY OF REPAIR OF ATRIAL SEPTAL DEFECT: ICD-10-CM

## 2022-05-31 DIAGNOSIS — Z00.00 ANNUAL PHYSICAL EXAM: Primary | ICD-10-CM

## 2022-05-31 DIAGNOSIS — E66.09 CLASS 1 OBESITY DUE TO EXCESS CALORIES WITH SERIOUS COMORBIDITY AND BODY MASS INDEX (BMI) OF 33.0 TO 33.9 IN ADULT: ICD-10-CM

## 2022-05-31 DIAGNOSIS — Z86.73 HISTORY OF CVA (CEREBROVASCULAR ACCIDENT): ICD-10-CM

## 2022-05-31 DIAGNOSIS — I47.1 ATRIAL TACHYCARDIA (HCC): ICD-10-CM

## 2022-05-31 DIAGNOSIS — E11.9 TYPE 2 DIABETES MELLITUS WITHOUT COMPLICATION, WITHOUT LONG-TERM CURRENT USE OF INSULIN (HCC): ICD-10-CM

## 2022-05-31 PROCEDURE — 99395 PREV VISIT EST AGE 18-39: CPT | Performed by: FAMILY MEDICINE

## 2022-05-31 RX ORDER — PANTOPRAZOLE SODIUM 40 MG/1
TABLET, DELAYED RELEASE ORAL
Qty: 90 TABLET | Refills: 3 | Status: SHIPPED | OUTPATIENT
Start: 2022-05-31

## 2022-05-31 RX ORDER — ZONISAMIDE 100 MG/1
200 CAPSULE ORAL EVERY EVENING
COMMUNITY
Start: 2022-04-19

## 2022-05-31 SDOH — ECONOMIC STABILITY: FOOD INSECURITY: WITHIN THE PAST 12 MONTHS, YOU WORRIED THAT YOUR FOOD WOULD RUN OUT BEFORE YOU GOT MONEY TO BUY MORE.: NEVER TRUE

## 2022-05-31 SDOH — ECONOMIC STABILITY: FOOD INSECURITY: WITHIN THE PAST 12 MONTHS, THE FOOD YOU BOUGHT JUST DIDN'T LAST AND YOU DIDN'T HAVE MONEY TO GET MORE.: NEVER TRUE

## 2022-05-31 ASSESSMENT — PATIENT HEALTH QUESTIONNAIRE - PHQ9
1. LITTLE INTEREST OR PLEASURE IN DOING THINGS: 0
SUM OF ALL RESPONSES TO PHQ QUESTIONS 1-9: 0
2. FEELING DOWN, DEPRESSED OR HOPELESS: 0
SUM OF ALL RESPONSES TO PHQ QUESTIONS 1-9: 0
SUM OF ALL RESPONSES TO PHQ9 QUESTIONS 1 & 2: 0
SUM OF ALL RESPONSES TO PHQ QUESTIONS 1-9: 0
SUM OF ALL RESPONSES TO PHQ QUESTIONS 1-9: 0

## 2022-05-31 ASSESSMENT — SOCIAL DETERMINANTS OF HEALTH (SDOH): HOW HARD IS IT FOR YOU TO PAY FOR THE VERY BASICS LIKE FOOD, HOUSING, MEDICAL CARE, AND HEATING?: NOT HARD AT ALL

## 2022-05-31 ASSESSMENT — ENCOUNTER SYMPTOMS
COUGH: 0
SHORTNESS OF BREATH: 0
ABDOMINAL PAIN: 0
NAUSEA: 0
SORE THROAT: 0

## 2022-05-31 NOTE — PROGRESS NOTES
[address] [facility] [address] PRIMARY CARE  [address] [facility] [address]  Dept: [phone]  Dept Fax: [phone]     5/31/2022      Konstantin Pacheco   1995     Chief Complaint   Patient presents with    Annual Exam       HPI  Pt comes in today for physical, first visit together since March 2021. Since then continues to follow regularly with all of her specialists. There have been some minor changes to medications, which we reconciled today. Chart review completed to go over labs that have been done since her last encounter as well. She does not have any acute concerns or questions today. Wt Readings from Last 5 Encounters:   05/31/22 204 lb (92.5 kg)   03/09/21 214 lb (97.1 kg)   02/18/20 206 lb 12.8 oz (93.8 kg)   02/05/20 209 lb (94.8 kg)   01/30/20 212 lb 4.9 oz (96.3 kg)       A1C 01/18/2022 6.1    Lipids July 2021   Ref Range & Units 10 mo ago   Hdl Cholesterol >=40 mg/dL 39 Low     Cholesterol Total <=199 mg/dL 128    Triglyceride <=199 mg/dL 117    Ldl (Calculated) <=159 mg/dl 65    Resulting Agency  [facility]     PHQ Scores 5/31/2022 3/9/2021 11/14/2018   PHQ2 Score 0 0 0   PHQ9 Score 0 0 0     Interpretation of Total Score Depression Severity: 1-4 = Minimal depression, 5-9 = Mild depression, 10-14 = Moderate depression, 15-19 = Moderately severe depression, 20-27 = Severe depression     Prior to Visit Medications    Medication Sig Taking?  Authorizing Provider   zonisamide (ZONEGRAN) 100 MG capsule Take 200 mg by mouth every evening Yes Historical Provider, MD   metoprolol tartrate (LOPRESSOR) 25 MG tablet Take 1 tablet by mouth nightly Yes Historical Provider, MD   dapagliflozin (FARXIGA) 10 MG tablet Take 10 mg by mouth daily Yes Historical Provider, MD   atorvastatin (LIPITOR) 20 MG tablet TAKE ONE TABLET BY MOUTH DAILY Yes Srinath Espositoes, DO   metFORMIN (GLUCOPHAGE) 500 MG tablet TAKE ONE TABLET BY MOUTH TWICE A DAY WITH MEALS Yes Padmini Gerard Emily Schultz, DO   pantoprazole (PROTONIX) 40 MG tablet TAKE ONE TABLET BY MOUTH DAILY Yes Shireen Grier, DO   warfarin (COUMADIN) 1 MG tablet Take 7mg daily PO (one 5mg tab + two 1mg tabs) Yes Historical Provider, MD   propafenone (RYTHMOL) 225 MG tablet TAKE ONE TABLET BY MOUTH EVERY 8 HOURS Yes Historical Provider, MD       Past Medical History:   Diagnosis Date    [facility] 5/8/2018    [facility]A [facility]ce[facility] vascular accident) ([facility]     Migraines     Type 2 diabetes mellitus without complication ([facility]         Social History     Tobacco Use    Smoking status: Never Smoker    Smokeless tobacco: Never Used   Vaping Use    Vaping Use: Not on file   Substance Use Topics    Alcohol use: No    Drug use: No        Past Surgical History:   Procedure Laterality Date    CARDIAC SURGERY  02/03/2017    Fontan fenestration closure         Allergies   Allergen Reactions    Acetaminophen Other (See Comments)     TYLENOL d/t HEPATIC Dz        Family History   Problem Relation Age of Onset    Other Mother         Sarcoidosis    Diabetes Mother     High Blood Pressure Mother     Diabetes Father     Heart Disease Father     Other Brother         Hernandezabasanta        Patient's past medical history, surgical history, family history, medications, and allergies  were all reviewed and updated as appropriate today. Review of Systems   Constitutional: Negative for fever. HENT: Negative for ear pain and sore throat. Respiratory: Negative for cough and shortness of breath. Cardiovascular: Negative for chest pain. Gastrointestinal: Negative for abdominal pain and nausea. Skin: Negative for rash. Neurological: Negative for dizziness and headaches. Hematological: Negative for adenopathy.        /64 (Site: Right Upper Arm, Position: Sitting, Cuff Size: Medium Adult)   Pulse 61   Temp 97 °F (36.1 °C) (Temporal)   Ht 5' 5\" (1.651 m)   Wt 204 lb (92.5 kg)   LMP 05/27/2022 (Approximate)   SpO2 92%   BMI 33.95 kg/m²      Physical Exam  Vitals reviewed. Constitutional:       General: She is not in acute distress. Appearance: Normal appearance. She is well-developed. She is obese. HENT:      Head: Normocephalic and atraumatic. Right Ear: Tympanic membrane and ear canal normal. No drainage. No middle ear effusion. Tympanic membrane is not erythematous. Left Ear: Tympanic membrane and ear canal normal. No drainage. No middle ear effusion. Tympanic membrane is not erythematous. Nose: Nose normal. No rhinorrhea. Mouth/Throat:      Mouth: Mucous membranes are moist.      Pharynx: No oropharyngeal exudate or posterior oropharyngeal erythema. Eyes:      Extraocular Movements: Extraocular movements intact. Pupils: Pupils are equal, round, and reactive to light. Neck:      Thyroid: No thyromegaly. Cardiovascular:      Rate and Rhythm: Normal rate and regular rhythm. Heart sounds: No murmur heard. Pulmonary:      Effort: Pulmonary effort is normal.      Breath sounds: Normal breath sounds. No wheezing. Abdominal:      General: Bowel sounds are normal.      Palpations: Abdomen is soft. There is no mass. Tenderness: There is no abdominal tenderness. Genitourinary:     Comments: deferred  Musculoskeletal:         General: No swelling. Normal range of motion. Cervical back: Neck supple. Lymphadenopathy:      Cervical: No cervical adenopathy. Skin:     General: Skin is warm and dry. Findings: No rash. Neurological:      General: No focal deficit present. Mental Status: She is alert and oriented to person, place, and time. Cranial Nerves: No cranial nerve deficit. Psychiatric:         Mood and Affect: Mood normal.         Assessment:  Encounter Diagnoses   Name Primary?     Annual physical exam Yes    Class 1 obesity due to excess calories with serious comorbidity and body mass index (BMI) of 33.0 to 33.9 in adult  Type 2 diabetes mellitus without complication, without long-term current use of insulin (HCC)     Atrial tachycardia (HCC)     History of repair of atrial septal defect - Fontan     History of CVA (cerebrovascular accident) due to embolism of precerebral artery        Plan:  1. Annual physical exam  General wellness exam. Reviewed chart for past hx and updated today. Counseled on age appropriate health guidance and discussed screening recommendations. Vaccinations reviewed and discussed. All questions answered    2. Class 1 obesity due to excess calories with serious comorbidity and body mass index (BMI) of 33.0 to 33.9 in adult  Patient was asked about current diet and exercise habits, and personalized advice was provided regarding recommended lifestyle changes. Patient's comorbid health conditions associated with elevated BMI were discussed, as well as the likely benefits weight loss. Based upon patient's motivation to change behavior, the following plan was agreed upon to work toward a weight loss goal - increasing CV activity, reducing carbs/calories and healthy eating habits. Educational materials for weight loss were provided. Patient will follow-up with myself at designated time in future. 3. Type 2 diabetes mellitus without complication, without long-term current use of insulin (HCC)  Stable, most recent A1c well controlled. No changes to medications at this time. 4. Atrial tachycardia ([address]  Continue per cardiology, seems to be under control at this time. 5. History of repair of atrial septal defect - Fontan    6. History of CVA (cerebrovascular accident) due to embolism of precerebral artery      Return in about 1 year (around 5/31/2023) for Physical.               Maria Elena Rico, DO     Please note that this chart was generated using dragon dictation software.   Although every effort was made to ensure the accuracy of this automated transcription, some errors in transcription may have occurred.

## 2022-10-17 RX ORDER — ATORVASTATIN CALCIUM 20 MG/1
TABLET, FILM COATED ORAL
Qty: 30 TABLET | Refills: 5 | Status: SHIPPED | OUTPATIENT
Start: 2022-10-17

## 2022-10-23 DIAGNOSIS — E11.9 TYPE 2 DIABETES MELLITUS WITHOUT COMPLICATION, WITHOUT LONG-TERM CURRENT USE OF INSULIN (HCC): ICD-10-CM

## 2022-11-15 ENCOUNTER — OFFICE VISIT (OUTPATIENT)
Dept: PRIMARY CARE CLINIC | Age: 27
End: 2022-11-15
Payer: MEDICAID

## 2022-11-15 VITALS
DIASTOLIC BLOOD PRESSURE: 60 MMHG | HEART RATE: 60 BPM | WEIGHT: 191.8 LBS | OXYGEN SATURATION: 90 % | HEIGHT: 65 IN | BODY MASS INDEX: 31.96 KG/M2 | SYSTOLIC BLOOD PRESSURE: 93 MMHG | TEMPERATURE: 97.8 F

## 2022-11-15 DIAGNOSIS — H65.93 MIDDLE EAR EFFUSION, BILATERAL: Primary | ICD-10-CM

## 2022-11-15 DIAGNOSIS — J06.9 UPPER RESPIRATORY TRACT INFECTION, UNSPECIFIED TYPE: ICD-10-CM

## 2022-11-15 PROCEDURE — G8484 FLU IMMUNIZE NO ADMIN: HCPCS | Performed by: FAMILY MEDICINE

## 2022-11-15 PROCEDURE — G8427 DOCREV CUR MEDS BY ELIG CLIN: HCPCS | Performed by: FAMILY MEDICINE

## 2022-11-15 PROCEDURE — 1036F TOBACCO NON-USER: CPT | Performed by: FAMILY MEDICINE

## 2022-11-15 PROCEDURE — G8417 CALC BMI ABV UP PARAM F/U: HCPCS | Performed by: FAMILY MEDICINE

## 2022-11-15 PROCEDURE — 99213 OFFICE O/P EST LOW 20 MIN: CPT | Performed by: FAMILY MEDICINE

## 2022-11-15 RX ORDER — FLUTICASONE PROPIONATE 50 MCG
1 SPRAY, SUSPENSION (ML) NASAL DAILY
Qty: 16 G | Refills: 0 | Status: SHIPPED | OUTPATIENT
Start: 2022-11-15

## 2022-11-15 RX ORDER — METOPROLOL SUCCINATE 50 MG/1
TABLET, EXTENDED RELEASE ORAL
COMMUNITY
Start: 2022-08-09

## 2022-11-15 RX ORDER — AMOXICILLIN 500 MG/1
500 CAPSULE ORAL 2 TIMES DAILY
Qty: 20 CAPSULE | Refills: 0 | Status: SHIPPED | OUTPATIENT
Start: 2022-11-15 | End: 2022-11-25

## 2022-11-15 NOTE — PROGRESS NOTES
[address] [facility] [address] PRIMARY CARE  [address]   [address] [facility] [address] [address]  Dept: [phone]  Dept Fax: [phone]     11/15/2022      Love Escalona   1995     Chief Complaint   Patient presents with    Otalgia           URI         HPI  Here today for eval ear pain/pressure. Reports about 1 week ago she had nasal congestion, runny nose and cough. Assumed this was a URI, but the bulk of these symptoms have cleared. The ear pain started 3 days ago. The R is bothering her more than the L. No fevers. No DC from ear. PHQ Scores 5/31/2022 3/9/2021 11/14/2018   PHQ2 Score 0 0 0   PHQ9 Score 0 0 0     Interpretation of Total Score Depression Severity: 1-4 = Minimal depression, 5-9 = Mild depression, 10-14 = Moderate depression, 15-19 = Moderately severe depression, 20-27 = Severe depression     Prior to Visit Medications    Medication Sig Taking?  Authorizing Provider   metoprolol succinate (TOPROL XL) 50 MG extended release tablet  Yes Historical Provider, MD   metFORMIN (GLUCOPHAGE) 500 MG tablet TAKE ONE TABLET BY MOUTH TWICE A DAY WITH MEALS Yes Kelley Falls, DO   atorvastatin (LIPITOR) 20 MG tablet TAKE ONE TABLET BY MOUTH DAILY Yes Kelley Falls, DO   zonisamide (ZONEGRAN) 100 MG capsule Take 200 mg by mouth every evening Yes Historical Provider, MD   dapagliflozin (FARXIGA) 10 MG tablet Take 10 mg by mouth daily Yes Historical Provider, MD   pantoprazole (PROTONIX) 40 MG tablet TAKE ONE TABLET BY MOUTH DAILY Yes Kelley Falls, DO   warfarin (COUMADIN) 1 MG tablet Take 7mg daily PO (one 5mg tab + two 1mg tabs) Yes Historical Provider, MD   propafenone (RYTHMOL) 225 MG tablet TAKE ONE TABLET BY MOUTH EVERY 8 HOURS Yes Historical Provider, MD       Past Medical History:   Diagnosis Date    Atrial tachycardia ([address] 5/8/2018    CVA (cerebral vascular accident) ([address]     Migraines     Type 2 diabetes mellitus without complication ([address] Social History     Tobacco Use    Smoking status: Never    Smokeless tobacco: Never   Substance Use Topics    Alcohol use: No    Drug use: No        Past Surgical History:   Procedure Laterality Date    CARDIAC SURGERY  02/03/2017    Fontan fenestration closure         Allergies   Allergen Reactions    Acetaminophen Other (See Comments)     TYLENOL d/t HEPATIC Dz        Family History   Problem Relation Age of Onset    Other Mother         Sarcoidosis    Diabetes Mother     High Blood Pressure Mother     Diabetes Father     Heart Disease Father     Other Brother         Jaisonfeers        Patient's past medical history, surgical history, family history, medications, and allergies  were all reviewed and updated as appropriate today. Review of Systems   Constitutional:  Negative for fever. HENT:  Positive for congestion (minimal) and ear pain. Negative for ear discharge and sore throat. Respiratory:  Negative for cough and shortness of breath. Cardiovascular:  Negative for chest pain. Gastrointestinal:  Negative for abdominal pain and nausea. Skin:  Negative for rash. Neurological:  Negative for dizziness and headaches. Hematological:  Negative for adenopathy. BP 93/60 (Cuff Size: Medium Adult)   Pulse 60   Temp 97.8 °F (36.6 °C) (Oral)   Ht 5' 4.75\" (1.645 m)   Wt 191 lb 12.8 oz (87 kg)   SpO2 90% Comment: room air  BMI 32.16 kg/m²      Physical Exam  Vitals reviewed. Constitutional:       General: She is not in acute distress. Appearance: She is obese. HENT:      Head: Normocephalic. Right Ear: Ear canal normal. A middle ear effusion (clear) is present. Tympanic membrane is bulging. Tympanic membrane is not erythematous or retracted. Left Ear: Ear canal normal. A middle ear effusion (clear) is present. Tympanic membrane is not erythematous, retracted or bulging.       Nose: Nose normal.      Mouth/Throat:      Mouth: Mucous membranes are moist.   Eyes: Extraocular Movements: Extraocular movements intact. Pupils: Pupils are equal, round, and reactive to light. Cardiovascular:      Rate and Rhythm: Normal rate and regular rhythm. Heart sounds: No murmur heard. Pulmonary:      Effort: Pulmonary effort is normal.      Breath sounds: Normal breath sounds. No wheezing. Abdominal:      General: Bowel sounds are normal.      Palpations: Abdomen is soft. Tenderness: There is no abdominal tenderness. Musculoskeletal:         General: No swelling or deformity. Cervical back: Neck supple. Lymphadenopathy:      Cervical: No cervical adenopathy. Skin:     General: Skin is warm and dry. Findings: No rash. Neurological:      Mental Status: She is alert and oriented to person, place, and time. Cranial Nerves: No cranial nerve deficit. Psychiatric:         Mood and Affect: Mood normal.         Behavior: Behavior is cooperative. Assessment:  Encounter Diagnoses   Name Primary? Middle ear effusion, bilateral (R>L) Yes    Upper respiratory tract infection, unspecified type - recent, now resolved        Plan:  1. Middle ear effusion, bilateral (R>L)  Clinical hx and exam c/w recent URI and middle ear effusions now, but not obvious AOM. Would like to treat with nasal steroids first and have Abx if not improved/symptoms worsen. Given precautions and answered all questions. - fluticasone (FLONASE) 50 MCG/ACT nasal spray; 1 spray by Each Nostril route daily  Dispense: 16 g; Refill: 0  - amoxicillin (AMOXIL) 500 MG capsule; Take 1 capsule by mouth 2 times daily for 10 days  Dispense: 20 capsule; Refill: 0    2. Upper respiratory tract infection, unspecified type - recent, now resolved  Acute onset mild illness c/w viral etiology and/or allergies. Exam nonfocal and no red flags on hx. Discussed expected course of illness and OTC agents that can be used for symptomatic relief. Given precautions and answered all questions.     Return if symptoms worsen or fail to improve. Ada Risser, DO     Please note that this chart was generated using dragon dictation software. Although every effort was made to ensure the accuracy of this automated transcription, some errors in transcription may have occurred.

## 2022-11-16 ASSESSMENT — ENCOUNTER SYMPTOMS
COUGH: 0
SORE THROAT: 0
SHORTNESS OF BREATH: 0
ABDOMINAL PAIN: 0
NAUSEA: 0

## 2023-01-31 ENCOUNTER — TELEPHONE (OUTPATIENT)
Dept: PRIMARY CARE CLINIC | Age: 28
End: 2023-01-31

## 2023-01-31 NOTE — TELEPHONE ENCOUNTER
Spoke with Dash Kirkland, she will check schedules to see when it would be convenient to bring [employer] over.   She will schedule the physical when she comes in for the A1c

## 2023-01-31 NOTE — TELEPHONE ENCOUNTER
Let pt know it has been just over 1 year since having had an A1C test. I would like them to come in just for a nurse visit to get an A1C done. Not due for physical with me until spring, can offer to schedule that as well.

## 2023-02-08 ENCOUNTER — NURSE ONLY (OUTPATIENT)
Dept: PRIMARY CARE CLINIC | Age: 28
End: 2023-02-08
Payer: MEDICAID

## 2023-02-08 DIAGNOSIS — E11.9 TYPE 2 DIABETES MELLITUS WITHOUT COMPLICATION, WITHOUT LONG-TERM CURRENT USE OF INSULIN (HCC): Primary | ICD-10-CM

## 2023-02-08 LAB — HBA1C MFR BLD: 6 %

## 2023-02-08 PROCEDURE — 83036 HEMOGLOBIN GLYCOSYLATED A1C: CPT | Performed by: FAMILY MEDICINE

## 2023-04-17 RX ORDER — ATORVASTATIN CALCIUM 20 MG/1
TABLET, FILM COATED ORAL
Qty: 30 TABLET | Refills: 5 | Status: SHIPPED | OUTPATIENT
Start: 2023-04-17

## 2023-04-17 NOTE — TELEPHONE ENCOUNTER
Medication:   Requested Prescriptions     Pending Prescriptions Disp Refills    atorvastatin (LIPITOR) 20 MG tablet [Pharmacy Med Name: ATORVASTATIN 20 MG TABLET] 30 tablet 5     Sig: TAKE ONE TABLET BY MOUTH DAILY     Last Filled:  3/18/2023    Last appt: 11/15/2022   Next appt: Visit date not found

## 2023-04-23 DIAGNOSIS — E11.9 TYPE 2 DIABETES MELLITUS WITHOUT COMPLICATION, WITHOUT LONG-TERM CURRENT USE OF INSULIN (HCC): ICD-10-CM

## 2023-04-24 RX ORDER — PANTOPRAZOLE SODIUM 40 MG/1
TABLET, DELAYED RELEASE ORAL
Qty: 90 TABLET | Refills: 3 | Status: SHIPPED | OUTPATIENT
Start: 2023-04-24

## 2023-05-05 ENCOUNTER — TELEPHONE (OUTPATIENT)
Dept: PRIMARY CARE CLINIC | Age: 28
End: 2023-05-05

## 2023-05-05 NOTE — TELEPHONE ENCOUNTER
Pt's mother calling saying last nite pt had an episode of heart racing. She says she called the cardio and did things over the phone with them and all was normal    She says this morning she realized she forgot to ask cardio about checking her potassium. [facility]. Suggested she call cardio back since they know more of what is already going on.   She says she will call them now

## 2023-08-16 ENCOUNTER — TELEMEDICINE (OUTPATIENT)
Dept: PRIMARY CARE CLINIC | Age: 28
End: 2023-08-16
Payer: MEDICAID

## 2023-08-16 DIAGNOSIS — U07.1 COVID-19: Primary | ICD-10-CM

## 2023-08-16 PROCEDURE — 99213 OFFICE O/P EST LOW 20 MIN: CPT | Performed by: FAMILY MEDICINE

## 2023-08-16 PROCEDURE — G8428 CUR MEDS NOT DOCUMENT: HCPCS | Performed by: FAMILY MEDICINE

## 2023-08-17 ENCOUNTER — TELEPHONE (OUTPATIENT)
Dept: PRIMARY CARE CLINIC | Age: 28
End: 2023-08-17

## 2023-08-17 NOTE — TELEPHONE ENCOUNTER
Pharmacy was calling regarding paxlovid that was just ordered. He states he is getting a flag for paxlovid and Propafenone 225. He states medications are not able to be taken together and wants to know if a alternate medication for covid can be sent.

## 2023-08-17 NOTE — TELEPHONE ENCOUNTER
Then I would simply recommend pt just use supportive care and treat with OTC meds for symptom relief. Stay well hydrated. No RX med to be sent in.

## 2023-08-19 ASSESSMENT — ENCOUNTER SYMPTOMS: COUGH: 1

## 2023-08-19 NOTE — PROGRESS NOTES
[address] [facility]. PRIMARY CARE  [address] [facility] [address]  Dept: [phone]  Dept Fax: [phone]     8/16/2023      Lalitha Dean   1995     Chief Complaint   Patient presents with    Positive For Covid-19       HPI    Pt encounter today completed virtual visit using virtual platform. Services were provided through a video synchronous discussion virtually to substitute for in-person clinic visit. Patient and provider were located at their individual locations. - Symptoms started yesterday. Cough and congestion. No fever. + home COVID test. PCP Dr. Theodore Vasquez. No difficulty breathing. Patient is a diabetic. Prior to Visit Medications    Medication Sig Taking? Authorizing Provider   nirmatrelvir/ritonavir 300/100 (PAXLOVID, 300/100,) 20 x 150 MG & 10 x 100MG TBPK Take 3 tablets (two 150 mg nirmatrelvir and one 100 mg ritonavir tablets) by mouth every 12 hours for 5 days.  Yes Darya Calvillo, DO   pantoprazole (PROTONIX) 40 MG tablet TAKE ONE TABLET BY MOUTH DAILY  Deloria Grumbles, DO   metFORMIN (GLUCOPHAGE) 500 MG tablet TAKE ONE TABLET BY MOUTH TWICE A DAY WITH MEALS  Deloria Grumbles, DO   atorvastatin (LIPITOR) 20 MG tablet TAKE ONE TABLET BY MOUTH DAILY  Deloria Grumbles, DO   metoprolol succinate (TOPROL XL) 50 MG extended release tablet   Historical Provider, MD   fluticasone (FLONASE) 50 MCG/ACT nasal spray 1 spray by Each Nostril route daily  Simeon Calvillo, DO   zonisamide (ZONEGRAN) 100 MG capsule Take 200 mg by mouth every evening  Historical Provider, MD   dapagliflozin (FARXIGA) 10 MG tablet Take 10 mg by mouth daily  Historical Provider, MD   warfarin (COUMADIN) 1 MG tablet Take 7mg daily PO (one 5mg tab + two 1mg tabs)  Historical Provider, MD   propafenone (RYTHMOL) 225 MG tablet TAKE ONE TABLET BY MOUTH EVERY 8 HOURS  Historical Provider, MD       Past Medical History:   Diagnosis Date    Atrial

## 2023-10-30 ENCOUNTER — OFFICE VISIT (OUTPATIENT)
Age: 28
End: 2023-10-30

## 2023-10-30 VITALS
TEMPERATURE: 98 F | BODY MASS INDEX: 33.32 KG/M2 | HEIGHT: 65 IN | HEART RATE: 73 BPM | DIASTOLIC BLOOD PRESSURE: 72 MMHG | SYSTOLIC BLOOD PRESSURE: 103 MMHG | WEIGHT: 200 LBS | OXYGEN SATURATION: 96 %

## 2023-10-30 DIAGNOSIS — J02.9 SORE THROAT: ICD-10-CM

## 2023-10-30 DIAGNOSIS — J06.9 VIRAL UPPER RESPIRATORY TRACT INFECTION: Primary | ICD-10-CM

## 2023-10-30 LAB
Lab: 0
QC PASS/FAIL: NORMAL
S PYO AG THROAT QL: NORMAL
SARS-COV-2 RDRP RESP QL NAA+PROBE: NEGATIVE

## 2023-10-30 RX ORDER — POLYETHYLENE GLYCOL 3350 17 G/17G
17 POWDER, FOR SOLUTION ORAL DAILY PRN
COMMUNITY

## 2023-10-30 RX ORDER — NYSTATIN 100000 [USP'U]/G
POWDER TOPICAL 4 TIMES DAILY
COMMUNITY

## 2023-10-30 ASSESSMENT — ENCOUNTER SYMPTOMS
TROUBLE SWALLOWING: 0
DIARRHEA: 0
ABDOMINAL PAIN: 0
NAUSEA: 0
SHORTNESS OF BREATH: 0
RHINORRHEA: 1
SORE THROAT: 1
VOMITING: 0
COUGH: 1

## 2023-10-30 NOTE — PROGRESS NOTES
Hilton Chou (:  1995) is a 32 y.o. female, New patient, here for evaluation of the following chief complaint(s):  Pharyngitis (Sore throat, body aches, no appetite, bilateral ear fullness x yesterday)    ASSESSMENT/PLAN:    ICD-10-CM    1. Viral upper respiratory tract infection  J06.9 POCT rapid strep A     POCT COVID-19 Rapid, NAAT      2. Sore throat  J02.9 POCT rapid strep A     POCT COVID-19 Rapid, NAAT        POC testing: Discussed result(s) with patient  Results for POC orders placed in visit on 10/30/23   POCT rapid strep A   Result Value Ref Range    Strep A Ag None Detected None Detected     COVID- Negative:   Ddx includes: Strep pharyngitis, URI, Covid-19, Tonsillitis   Disposition: 27yo female here  with URI symptoms. VSS. Physical Exam as below. Covid and Rapid strep Negative. Centor score Zero. No further testing at this time. Advised supportive care for viral URI. Education and handout provided on diagnosis and management of symptoms. AVS reviewed with patient. All questions answered. If symptoms worsen go to the Emergency Department. Follow up in clinic or with PCP as needed. Patient is agreeable with plan. No follow-ups on file. SUBJECTIVE/OBJECTIVE:  27yo female here for evaluation of sorethroat,bodyaches, and bilateral ear pain x2dys. Denies fever, chills, N/V/D, abdominal pain, CP,  SOB or any sick contact. History provided by:  Patient and caregiver   used: No    URI   This is a new problem. The current episode started yesterday. The problem has been unchanged. There has been no fever. Associated symptoms include congestion, coughing, ear pain, rhinorrhea and a sore throat. Pertinent negatives include no abdominal pain, chest pain, diarrhea, headaches, nausea, neck pain, rash or vomiting. She has tried acetaminophen for the symptoms. The treatment provided mild relief.      Vitals:    10/30/23 1054   BP: 103/72   Site: Right Upper Arm   Position: Sitting

## 2023-10-30 NOTE — PATIENT INSTRUCTIONS
Increase fluid intake   Wash hands frequently   Gargle with warm salty water  Follow up with PCP in 1week or sooner for worsening symptoms.

## 2023-11-27 RX ORDER — ATORVASTATIN CALCIUM 20 MG/1
TABLET, FILM COATED ORAL
Qty: 90 TABLET | Refills: 3 | Status: SHIPPED | OUTPATIENT
Start: 2023-11-27

## 2023-11-27 NOTE — TELEPHONE ENCOUNTER
Medication:   Requested Prescriptions     Pending Prescriptions Disp Refills    atorvastatin (LIPITOR) 20 MG tablet [Pharmacy Med Name: ATORVASTATIN 20 MG TABLET] 90 tablet      Sig: TAKE ONE TABLET BY MOUTH DAILY     Last Filled:  10/8/23    Last appt: 8/16/2023   Next appt: Visit date not found

## 2024-03-05 ENCOUNTER — OFFICE VISIT (OUTPATIENT)
Dept: PRIMARY CARE CLINIC | Age: 29
End: 2024-03-05
Payer: MEDICAID

## 2024-03-05 VITALS
HEART RATE: 72 BPM | SYSTOLIC BLOOD PRESSURE: 102 MMHG | WEIGHT: 202 LBS | BODY MASS INDEX: 33.61 KG/M2 | OXYGEN SATURATION: 96 % | TEMPERATURE: 98.1 F | DIASTOLIC BLOOD PRESSURE: 68 MMHG

## 2024-03-05 DIAGNOSIS — Z00.00 ANNUAL PHYSICAL EXAM: ICD-10-CM

## 2024-03-05 DIAGNOSIS — E66.09 CLASS 1 OBESITY DUE TO EXCESS CALORIES WITH SERIOUS COMORBIDITY AND BODY MASS INDEX (BMI) OF 33.0 TO 33.9 IN ADULT: ICD-10-CM

## 2024-03-05 DIAGNOSIS — D50.9 MICROCYTIC HYPOCHROMIC ANEMIA: Primary | ICD-10-CM

## 2024-03-05 DIAGNOSIS — E11.9 TYPE 2 DIABETES MELLITUS WITHOUT COMPLICATION, WITHOUT LONG-TERM CURRENT USE OF INSULIN (HCC): ICD-10-CM

## 2024-03-05 DIAGNOSIS — R62.50 DEVELOPMENTAL DELAY: ICD-10-CM

## 2024-03-05 DIAGNOSIS — R42 DIZZINESS: ICD-10-CM

## 2024-03-05 DIAGNOSIS — Z87.74 HISTORY OF REPAIR OF ATRIAL SEPTAL DEFECT: ICD-10-CM

## 2024-03-05 DIAGNOSIS — K74.00 FIBROSIS OF LIVER: ICD-10-CM

## 2024-03-05 DIAGNOSIS — R10.84 GENERALIZED ABDOMINAL PAIN: ICD-10-CM

## 2024-03-05 DIAGNOSIS — Z00.00 ANNUAL PHYSICAL EXAM: Primary | ICD-10-CM

## 2024-03-05 LAB — HBA1C MFR BLD: 5.6 %

## 2024-03-05 PROCEDURE — 83036 HEMOGLOBIN GLYCOSYLATED A1C: CPT | Performed by: FAMILY MEDICINE

## 2024-03-05 PROCEDURE — G8482 FLU IMMUNIZE ORDER/ADMIN: HCPCS | Performed by: FAMILY MEDICINE

## 2024-03-05 PROCEDURE — 99395 PREV VISIT EST AGE 18-39: CPT | Performed by: FAMILY MEDICINE

## 2024-03-05 RX ORDER — FERROUS SULFATE 325(65) MG
65 TABLET ORAL EVERY OTHER DAY
COMMUNITY
Start: 2023-12-05 | End: 2024-03-07 | Stop reason: SDUPTHER

## 2024-03-05 SDOH — ECONOMIC STABILITY: INCOME INSECURITY: HOW HARD IS IT FOR YOU TO PAY FOR THE VERY BASICS LIKE FOOD, HOUSING, MEDICAL CARE, AND HEATING?: NOT HARD AT ALL

## 2024-03-05 SDOH — ECONOMIC STABILITY: HOUSING INSECURITY
IN THE LAST 12 MONTHS, WAS THERE A TIME WHEN YOU DID NOT HAVE A STEADY PLACE TO SLEEP OR SLEPT IN A SHELTER (INCLUDING NOW)?: NO

## 2024-03-05 SDOH — ECONOMIC STABILITY: FOOD INSECURITY: WITHIN THE PAST 12 MONTHS, THE FOOD YOU BOUGHT JUST DIDN'T LAST AND YOU DIDN'T HAVE MONEY TO GET MORE.: NEVER TRUE

## 2024-03-05 SDOH — ECONOMIC STABILITY: FOOD INSECURITY: WITHIN THE PAST 12 MONTHS, YOU WORRIED THAT YOUR FOOD WOULD RUN OUT BEFORE YOU GOT MONEY TO BUY MORE.: NEVER TRUE

## 2024-03-05 ASSESSMENT — PATIENT HEALTH QUESTIONNAIRE - PHQ9
3. TROUBLE FALLING OR STAYING ASLEEP: 3
2. FEELING DOWN, DEPRESSED OR HOPELESS: 3
8. MOVING OR SPEAKING SO SLOWLY THAT OTHER PEOPLE COULD HAVE NOTICED. OR THE OPPOSITE, BEING SO FIGETY OR RESTLESS THAT YOU HAVE BEEN MOVING AROUND A LOT MORE THAN USUAL: 0
10. IF YOU CHECKED OFF ANY PROBLEMS, HOW DIFFICULT HAVE THESE PROBLEMS MADE IT FOR YOU TO DO YOUR WORK, TAKE CARE OF THINGS AT HOME, OR GET ALONG WITH OTHER PEOPLE: 1
1. LITTLE INTEREST OR PLEASURE IN DOING THINGS: 3
SUM OF ALL RESPONSES TO PHQ QUESTIONS 1-9: 15
7. TROUBLE CONCENTRATING ON THINGS, SUCH AS READING THE NEWSPAPER OR WATCHING TELEVISION: 0
SUM OF ALL RESPONSES TO PHQ9 QUESTIONS 1 & 2: 6
SUM OF ALL RESPONSES TO PHQ QUESTIONS 1-9: 15
5. POOR APPETITE OR OVEREATING: 3
SUM OF ALL RESPONSES TO PHQ QUESTIONS 1-9: 15
4. FEELING TIRED OR HAVING LITTLE ENERGY: 3
6. FEELING BAD ABOUT YOURSELF - OR THAT YOU ARE A FAILURE OR HAVE LET YOURSELF OR YOUR FAMILY DOWN: 0
SUM OF ALL RESPONSES TO PHQ QUESTIONS 1-9: 15

## 2024-03-05 NOTE — PATIENT INSTRUCTIONS
[facility] Locations - No appointment necessary.  ? indicates the location is open Saturdays in addition to Monday through Friday.   Call your preferred location for test preparation, business hours and other information you need.   [facility] accepts all insurances.  CENTRAL  EAST  [place]    ? [facility]   [address]   [address]   [place] [address]    Ph: [phone]  [facility]   [address] [place] [address]    Ph: [phone]   ? [facility]   [address]    [place] [address]    Ph: [phone]     [facility]   [address] [place] [address]    Ph: [phone] ? [address] [place] [address]   Ph: [phone]  ? [facility]   [address] [place] [address]    Ph: [phone]      [facility]   [address] [place] [address]   Ph: [phone]    NORTH    ? [facility]   [address] [place] [address]    Ph: [phone]  [facility]   [address] [place] [address]   Ph: [phone]  [facility]   [address], [address]    PH: [phone]    [facility]   [address] [place] [address]    Ph: [phone]  [facility]  [address] [place] [address]  Ph: [phone]  [facility]   [address] [place] [address]    Ph: [phone]

## 2024-03-05 NOTE — PROGRESS NOTES
[facility]  [facility] PRIMARY CARE  [address] [place] [address]  Dept: [phone]  Dept Fax: [phone]     3/5/2024      Donniephucfelicia LANGE Sandhya   1995     Chief Complaint   Patient presents with    Annual Exam       HPI  Pt, accompanied by her mother, comes in today for physical. Since our last visit there has been minimal change to meds. She has continues to follow with her specialists regularly. What has her/mother concerned is some reports of abd pain off/on and dizziness over last few months. No other significant symptoms reported. Maybe getting a little worse. Of note, had abd MRI 2/2 liver fibrosis in 11/2023 - has not seen GI in some time.    HgBA1c:    Lab Results   Component Value Date/Time    LABA1C 5.6 03/05/2024 11:06 AM         3/5/2024    10:20 AM 5/31/2022     9:42 AM 3/9/2021    11:35 AM 11/14/2018     2:40 PM   PHQ Scores   PHQ2 Score 6 0 0 0   PHQ9 Score 15 0 0 0     Interpretation of Total Score Depression Severity: 1-4 = Minimal depression, 5-9 = Mild depression, 10-14 = Moderate depression, 15-19 = Moderately severe depression, 20-27 = Severe depression     Prior to Visit Medications    Medication Sig Taking? Authorizing Provider   ferrous sulfate (IRON 325) 325 (65 Fe) MG tablet Take 65 mg by mouth every other day Yes ProviderGlen MD   atorvastatin (LIPITOR) 20 MG tablet TAKE ONE TABLET BY MOUTH DAILY Yes Rohit Calvillo DO   nystatin (MYCOSTATIN) 775995 UNIT/GM powder Apply topically 4 times daily Apply topically 4 times daily. Yes Provider, MD Glen   polyethylene glycol (GLYCOLAX) 17 g packet Take 1 packet by mouth daily as needed for Constipation Yes Glen Dwyer MD   pantoprazole (PROTONIX) 40 MG tablet TAKE ONE TABLET BY MOUTH DAILY Yes Rohit Calvillo DO   metFORMIN (GLUCOPHAGE) 500 MG tablet TAKE ONE TABLET BY MOUTH TWICE A DAY WITH MEALS Yes Rohit Calvillo DO   zonisamide (ZONEGRAN) 100

## 2024-03-06 LAB
ALBUMIN SERPL-MCNC: 4.6 G/DL (ref 3.4–5)
ALBUMIN/GLOB SERPL: 1.5 {RATIO} (ref 1.1–2.2)
ALP SERPL-CCNC: 102 U/L (ref 40–129)
ALT SERPL-CCNC: 10 U/L (ref 10–40)
ANION GAP SERPL CALCULATED.3IONS-SCNC: 14 MMOL/L (ref 3–16)
AST SERPL-CCNC: 14 U/L (ref 15–37)
BASOPHILS # BLD: 0.1 K/UL (ref 0–0.2)
BASOPHILS NFR BLD: 1 %
BILIRUB SERPL-MCNC: 0.3 MG/DL (ref 0–1)
BUN SERPL-MCNC: 12 MG/DL (ref 7–20)
CALCIUM SERPL-MCNC: 9.5 MG/DL (ref 8.3–10.6)
CHLORIDE SERPL-SCNC: 104 MMOL/L (ref 99–110)
CO2 SERPL-SCNC: 21 MMOL/L (ref 21–32)
CREAT SERPL-MCNC: 0.9 MG/DL (ref 0.6–1.1)
DEPRECATED RDW RBC AUTO: 23.3 % (ref 12.4–15.4)
EOSINOPHIL # BLD: 0.1 K/UL (ref 0–0.6)
EOSINOPHIL NFR BLD: 1.3 %
GFR SERPLBLD CREATININE-BSD FMLA CKD-EPI: >60 ML/MIN/{1.73_M2}
GLUCOSE P FAST SERPL-MCNC: 82 MG/DL (ref 70–99)
HCT VFR BLD AUTO: 35.4 % (ref 36–48)
HGB BLD-MCNC: 10.5 G/DL (ref 12–16)
LYMPHOCYTES # BLD: 1.4 K/UL (ref 1–5.1)
LYMPHOCYTES NFR BLD: 21.8 %
MCH RBC QN AUTO: 21.1 PG (ref 26–34)
MCHC RBC AUTO-ENTMCNC: 29.7 G/DL (ref 31–36)
MCV RBC AUTO: 71.2 FL (ref 80–100)
MONOCYTES # BLD: 1.1 K/UL (ref 0–1.3)
MONOCYTES NFR BLD: 16 %
NEUTROPHILS # BLD: 4 K/UL (ref 1.7–7.7)
NEUTROPHILS NFR BLD: 59.9 %
PLATELET # BLD AUTO: 265 K/UL (ref 135–450)
PMV BLD AUTO: 10 FL (ref 5–10.5)
POTASSIUM SERPL-SCNC: 4.5 MMOL/L (ref 3.5–5.1)
PROT SERPL-MCNC: 7.7 G/DL (ref 6.4–8.2)
RBC # BLD AUTO: 4.97 M/UL (ref 4–5.2)
SODIUM SERPL-SCNC: 139 MMOL/L (ref 136–145)
T3FREE SERPL-MCNC: 2.4 PG/ML (ref 2.3–4.2)
T4 FREE SERPL-MCNC: 1.3 NG/DL (ref 0.9–1.8)
TSH SERPL DL<=0.005 MIU/L-ACNC: 2.02 UIU/ML (ref 0.27–4.2)
WBC # BLD AUTO: 6.6 K/UL (ref 4–11)

## 2024-03-06 ASSESSMENT — ENCOUNTER SYMPTOMS
SHORTNESS OF BREATH: 0
WHEEZING: 0
ABDOMINAL PAIN: 1
SORE THROAT: 0
CONSTIPATION: 0
EYE ITCHING: 0
DIARRHEA: 0
EYE PAIN: 0
VOMITING: 0
NAUSEA: 0
COUGH: 0
BLOOD IN STOOL: 0

## 2024-03-07 RX ORDER — FERROUS SULFATE 325(65) MG
325 TABLET ORAL 2 TIMES DAILY
Qty: 180 TABLET | Refills: 1 | Status: SHIPPED | OUTPATIENT
Start: 2024-03-07

## 2024-03-07 NOTE — RESULT ENCOUNTER NOTE
Please let pt know I have reviewed labs. For the most part things look good. I suspect iron levels are low, current hemoglobin levels are 10.5. This is low, and down some from last May when checked at [facility]. Is she taking iron currently, if so, just 1 tablet daily as reported?

## 2024-03-07 NOTE — PROGRESS NOTES
Ok. I have sent RX for iron to be taken twice daily. I have ordered repeat CBC and iron levels to be done in about 2 1/2 months. Please schedule 3 month f/u with me

## 2024-04-11 DIAGNOSIS — E11.9 TYPE 2 DIABETES MELLITUS WITHOUT COMPLICATION, WITHOUT LONG-TERM CURRENT USE OF INSULIN (HCC): ICD-10-CM

## 2024-04-11 NOTE — TELEPHONE ENCOUNTER
Medication:   Requested Prescriptions     Pending Prescriptions Disp Refills    metFORMIN (GLUCOPHAGE) 500 MG tablet [Pharmacy Med Name: METFORMIN  MG TABLET] 180 tablet 3     Sig: TAKE ONE TABLET BY MOUTH TWICE A DAY WITH MEALS     Last Filled:  1/16/24    Last appt: 3/5/2024   Next appt: 6/10/2024

## 2024-04-16 RX ORDER — PANTOPRAZOLE SODIUM 40 MG/1
TABLET, DELAYED RELEASE ORAL
Qty: 90 TABLET | Refills: 3 | Status: SHIPPED | OUTPATIENT
Start: 2024-04-16

## 2024-04-22 RX ORDER — PANTOPRAZOLE SODIUM 40 MG/1
TABLET, DELAYED RELEASE ORAL
Qty: 90 TABLET | Refills: 3 | OUTPATIENT
Start: 2024-04-22

## 2024-06-10 ENCOUNTER — OFFICE VISIT (OUTPATIENT)
Dept: PRIMARY CARE CLINIC | Age: 29
End: 2024-06-10
Payer: MEDICAID

## 2024-06-10 ENCOUNTER — OFFICE VISIT (OUTPATIENT)
Age: 29
End: 2024-06-10
Payer: MEDICAID

## 2024-06-10 VITALS
SYSTOLIC BLOOD PRESSURE: 117 MMHG | TEMPERATURE: 97.5 F | DIASTOLIC BLOOD PRESSURE: 81 MMHG | RESPIRATION RATE: 19 BRPM | HEART RATE: 72 BPM | OXYGEN SATURATION: 93 %

## 2024-06-10 VITALS
HEART RATE: 70 BPM | DIASTOLIC BLOOD PRESSURE: 70 MMHG | OXYGEN SATURATION: 95 % | TEMPERATURE: 97.7 F | WEIGHT: 198 LBS | SYSTOLIC BLOOD PRESSURE: 103 MMHG | BODY MASS INDEX: 32.95 KG/M2

## 2024-06-10 DIAGNOSIS — Z79.01 ON ANTICOAGULANT THERAPY: ICD-10-CM

## 2024-06-10 DIAGNOSIS — K13.79 ORAL BLEEDING: Primary | ICD-10-CM

## 2024-06-10 DIAGNOSIS — K06.8 GINGIVAL HEMORRHAGE: Primary | ICD-10-CM

## 2024-06-10 DIAGNOSIS — Z79.01 LONG TERM CURRENT USE OF ANTICOAGULANT THERAPY: ICD-10-CM

## 2024-06-10 PROCEDURE — G8427 DOCREV CUR MEDS BY ELIG CLIN: HCPCS | Performed by: FAMILY MEDICINE

## 2024-06-10 PROCEDURE — 1036F TOBACCO NON-USER: CPT | Performed by: OTOLARYNGOLOGY

## 2024-06-10 PROCEDURE — G8417 CALC BMI ABV UP PARAM F/U: HCPCS | Performed by: OTOLARYNGOLOGY

## 2024-06-10 PROCEDURE — 99203 OFFICE O/P NEW LOW 30 MIN: CPT | Performed by: OTOLARYNGOLOGY

## 2024-06-10 PROCEDURE — 99213 OFFICE O/P EST LOW 20 MIN: CPT | Performed by: FAMILY MEDICINE

## 2024-06-10 PROCEDURE — G8427 DOCREV CUR MEDS BY ELIG CLIN: HCPCS | Performed by: OTOLARYNGOLOGY

## 2024-06-10 PROCEDURE — G8417 CALC BMI ABV UP PARAM F/U: HCPCS | Performed by: FAMILY MEDICINE

## 2024-06-10 PROCEDURE — 1036F TOBACCO NON-USER: CPT | Performed by: FAMILY MEDICINE

## 2024-06-10 RX ORDER — WARFARIN SODIUM 5 MG/1
TABLET ORAL
COMMUNITY
Start: 2024-05-31

## 2024-06-10 RX ORDER — ZONISAMIDE 50 MG/1
CAPSULE ORAL
COMMUNITY
Start: 2024-05-28

## 2024-06-10 NOTE — PROGRESS NOTES
CHIEF COMPLAINT: Bleeding from the oral cavity.    HISTORY OF PRESENT ILLNESS:  28 y.o. female who presents with bleeding from the oral cavity of 4 days duration.  The patient has had surgery for a congenital heart defect and in addition has a history of a cerebrovascular accident and for this reason is on warfarin.  She does not smoke.  She has no oral cavity pain.    PAST MEDICAL HISTORY:   Social History     Tobacco Use   Smoking Status Never   Smokeless Tobacco Never                                                    Social History     Substance and Sexual Activity   Alcohol Use No                                                    Current Outpatient Medications:     pantoprazole (PROTONIX) 40 MG tablet, TAKE ONE TABLET BY MOUTH DAILY, Disp: 90 tablet, Rfl: 3    metFORMIN (GLUCOPHAGE) 500 MG tablet, TAKE ONE TABLET BY MOUTH TWICE A DAY WITH MEALS, Disp: 180 tablet, Rfl: 3    ferrous sulfate (IRON 325) 325 (65 Fe) MG tablet, Take 1 tablet by mouth 2 times daily, Disp: 180 tablet, Rfl: 1    atorvastatin (LIPITOR) 20 MG tablet, TAKE ONE TABLET BY MOUTH DAILY, Disp: 90 tablet, Rfl: 3    nystatin (MYCOSTATIN) 983579 UNIT/GM powder, Apply topically 4 times daily Apply topically 4 times daily., Disp: , Rfl:     polyethylene glycol (GLYCOLAX) 17 g packet, Take 1 packet by mouth daily as needed for Constipation, Disp: , Rfl:     zonisamide (ZONEGRAN) 100 MG capsule, Take 200 mg by mouth every evening, Disp: , Rfl:     dapagliflozin (FARXIGA) 10 MG tablet, Take 10 mg by mouth daily, Disp: , Rfl:     warfarin (COUMADIN) 1 MG tablet, Take 7mg daily PO (one 5mg tab + two 1mg tabs), Disp: , Rfl:     propafenone (RYTHMOL) 225 MG tablet, TAKE ONE TABLET BY MOUTH EVERY 8 HOURS, Disp: , Rfl:                                                  Past Medical History:   Diagnosis Date    Atrial tachycardia (HCC) 5/8/2018    CVA (cerebral vascular accident) (HCC)     Migraines     Type 2 diabetes mellitus without complication (HCC)

## 2024-06-10 NOTE — PROGRESS NOTES
10 MG tablet Take 10 mg by mouth daily Yes Provider, MD Glen   warfarin (COUMADIN) 1 MG tablet Take 7mg daily PO (one 5mg tab + two 1mg tabs) Yes Provider, MD Glen   propafenone (RYTHMOL) 225 MG tablet TAKE ONE TABLET BY MOUTH EVERY 8 HOURS Yes Provider, MD Glen       Past Medical History:   Diagnosis Date    Atrial tachycardia (HCC) 5/8/2018    CVA (cerebral vascular accident) (HCC)     Migraines     Type 2 diabetes mellitus without complication (HCC)         Social History     Tobacco Use    Smoking status: Never    Smokeless tobacco: Never   Substance Use Topics    Alcohol use: No    Drug use: No        Past Surgical History:   Procedure Laterality Date    CARDIAC SURGERY  02/03/2017    Fontan fenestration closure         Allergies   Allergen Reactions    Acetaminophen Other (See Comments)     TYLENOL d/t HEPATIC Dz        Family History   Problem Relation Age of Onset    Other Mother         Sarcoidosis    Diabetes Mother     High Blood Pressure Mother     Diabetes Father     Heart Disease Father     Other Brother         Klinefelters        Patient's past medical history, surgical history, family history, medications, and allergies  were all reviewed and updated as appropriate today.    Review of systems per HPI above, otherwise negative.\    /70 (Site: Left Upper Arm, Cuff Size: Medium Adult)   Pulse 70   Temp 97.7 °F (36.5 °C)   Wt 89.8 kg (198 lb)   LMP 05/27/2024   SpO2 95%   BMI 32.95 kg/m²      Physical Exam  Constitutional:       General: She is not in acute distress.     Appearance: Normal appearance.   HENT:      Head: Normocephalic and atraumatic.      Mouth/Throat:      Comments: +active fresh bright red blood and slightly thickened coagulated blood. No active sights of bleeding visible.  Eyes:      Extraocular Movements: Extraocular movements intact.   Pulmonary:      Effort: Pulmonary effort is normal.   Neurological:      General: No focal deficit present.

## 2025-01-01 ENCOUNTER — APPOINTMENT (OUTPATIENT)
Dept: GENERAL RADIOLOGY | Age: 30
End: 2025-01-01
Payer: MEDICAID

## 2025-01-01 ENCOUNTER — APPOINTMENT (OUTPATIENT)
Dept: CT IMAGING | Age: 30
End: 2025-01-01
Payer: MEDICAID

## 2025-01-01 ENCOUNTER — HOSPITAL ENCOUNTER (EMERGENCY)
Age: 30
Discharge: ANOTHER HOSPITAL | End: 2025-04-23
Attending: EMERGENCY MEDICINE
Payer: MEDICAID

## 2025-01-01 VITALS
WEIGHT: 187.61 LBS | SYSTOLIC BLOOD PRESSURE: 90 MMHG | BODY MASS INDEX: 29.45 KG/M2 | HEIGHT: 67 IN | DIASTOLIC BLOOD PRESSURE: 72 MMHG | HEART RATE: 113 BPM | TEMPERATURE: 99.1 F | RESPIRATION RATE: 24 BRPM | OXYGEN SATURATION: 68 %

## 2025-01-01 DIAGNOSIS — J96.02 ACUTE RESPIRATORY FAILURE WITH HYPOXIA AND HYPERCARBIA (HCC): ICD-10-CM

## 2025-01-01 DIAGNOSIS — J96.01 ACUTE RESPIRATORY FAILURE WITH HYPOXIA AND HYPERCARBIA (HCC): ICD-10-CM

## 2025-01-01 DIAGNOSIS — R79.89 ELEVATED TROPONIN: ICD-10-CM

## 2025-01-01 DIAGNOSIS — Z87.74 HISTORY OF CONGENITAL ANOMALY OF HEART: ICD-10-CM

## 2025-01-01 DIAGNOSIS — I46.9 CARDIAC ARREST (HCC): Primary | ICD-10-CM

## 2025-01-01 LAB
ALBUMIN SERPL-MCNC: 3.5 G/DL (ref 3.4–5)
ALBUMIN/GLOB SERPL: 1.3 {RATIO} (ref 1.1–2.2)
ALP SERPL-CCNC: 115 U/L (ref 40–129)
ALT SERPL-CCNC: 39 U/L (ref 10–40)
ANION GAP SERPL CALCULATED.3IONS-SCNC: 23 MMOL/L (ref 3–16)
APTT BLD: 36.3 SEC (ref 22.1–36.4)
AST SERPL-CCNC: 45 U/L (ref 15–37)
BASE EXCESS BLDV CALC-SCNC: -12.9 MMOL/L
BASOPHILS # BLD: 0.1 K/UL (ref 0–0.2)
BASOPHILS NFR BLD: 0.4 %
BILIRUB SERPL-MCNC: 0.6 MG/DL (ref 0–1)
BUN SERPL-MCNC: 8 MG/DL (ref 7–20)
CALCIUM SERPL-MCNC: 8.3 MG/DL (ref 8.3–10.6)
CHLORIDE SERPL-SCNC: 102 MMOL/L (ref 99–110)
CO2 BLDV-SCNC: 20 MMOL/L
CO2 SERPL-SCNC: 15 MMOL/L (ref 21–32)
COHGB MFR BLDV: 0.9 %
CREAT SERPL-MCNC: 1.1 MG/DL (ref 0.6–1.1)
D-DIMER QUANTITATIVE: 9.77 UG/ML FEU (ref 0–0.6)
DEPRECATED RDW RBC AUTO: 16.9 % (ref 12.4–15.4)
EKG ATRIAL RATE: 103 BPM
EKG DIAGNOSIS: NORMAL
EKG P-R INTERVAL: 220 MS
EKG Q-T INTERVAL: 408 MS
EKG QRS DURATION: 110 MS
EKG QTC CALCULATION (BAZETT): 535 MS
EKG R AXIS: -58 DEGREES
EKG T AXIS: 112 DEGREES
EKG VENTRICULAR RATE: 103 BPM
EOSINOPHIL # BLD: 0.1 K/UL (ref 0–0.6)
EOSINOPHIL NFR BLD: 0.4 %
GFR SERPLBLD CREATININE-BSD FMLA CKD-EPI: 70 ML/MIN/{1.73_M2}
GLUCOSE SERPL-MCNC: 300 MG/DL (ref 70–99)
HCG SERPL QL: NEGATIVE
HCO3 BLDV-SCNC: 18 MMOL/L (ref 23–29)
HCT VFR BLD AUTO: 43.7 % (ref 36–48)
HGB BLD-MCNC: 13.8 G/DL (ref 12–16)
INR PPP: 2.36 (ref 0.85–1.15)
LACTATE BLDV-SCNC: 8.8 MMOL/L (ref 0.4–2)
LYMPHOCYTES # BLD: 4.9 K/UL (ref 1–5.1)
LYMPHOCYTES NFR BLD: 36.3 %
MCH RBC QN AUTO: 28.8 PG (ref 26–34)
MCHC RBC AUTO-ENTMCNC: 31.5 G/DL (ref 31–36)
MCV RBC AUTO: 91.5 FL (ref 80–100)
METHGB MFR BLDV: 0.4 %
MONOCYTES # BLD: 0.6 K/UL (ref 0–1.3)
MONOCYTES NFR BLD: 4.7 %
NEUTROPHILS # BLD: 7.8 K/UL (ref 1.7–7.7)
NEUTROPHILS NFR BLD: 58.2 %
NT-PROBNP SERPL-MCNC: 1237 PG/ML (ref 0–124)
O2 THERAPY: ABNORMAL
PCO2 BLDV: 60.6 MMHG (ref 40–50)
PH BLDV: 7.08 [PH] (ref 7.35–7.45)
PLATELET # BLD AUTO: 137 K/UL (ref 135–450)
PMV BLD AUTO: 11.3 FL (ref 5–10.5)
PO2 BLDV: 34 MMHG
POTASSIUM SERPL-SCNC: 3.7 MMOL/L (ref 3.5–5.1)
PROT SERPL-MCNC: 6.2 G/DL (ref 6.4–8.2)
PROTHROMBIN TIME: 25.8 SEC (ref 11.9–14.9)
RBC # BLD AUTO: 4.78 M/UL (ref 4–5.2)
SAO2 % BLDV: 39 %
SODIUM SERPL-SCNC: 140 MMOL/L (ref 136–145)
TROPONIN, HIGH SENSITIVITY: 13 NG/L (ref 0–14)
TROPONIN, HIGH SENSITIVITY: 41 NG/L (ref 0–14)
WBC # BLD AUTO: 13.4 K/UL (ref 4–11)

## 2025-01-01 PROCEDURE — 36415 COLL VENOUS BLD VENIPUNCTURE: CPT

## 2025-01-01 PROCEDURE — 83605 ASSAY OF LACTIC ACID: CPT

## 2025-01-01 PROCEDURE — 99285 EMERGENCY DEPT VISIT HI MDM: CPT

## 2025-01-01 PROCEDURE — 2500000003 HC RX 250 WO HCPCS: Performed by: EMERGENCY MEDICINE

## 2025-01-01 PROCEDURE — 85610 PROTHROMBIN TIME: CPT

## 2025-01-01 PROCEDURE — 83880 ASSAY OF NATRIURETIC PEPTIDE: CPT

## 2025-01-01 PROCEDURE — 85730 THROMBOPLASTIN TIME PARTIAL: CPT

## 2025-01-01 PROCEDURE — 84703 CHORIONIC GONADOTROPIN ASSAY: CPT

## 2025-01-01 PROCEDURE — 84484 ASSAY OF TROPONIN QUANT: CPT

## 2025-01-01 PROCEDURE — 2500000003 HC RX 250 WO HCPCS

## 2025-01-01 PROCEDURE — 36556 INSERT NON-TUNNEL CV CATH: CPT

## 2025-01-01 PROCEDURE — 80053 COMPREHEN METABOLIC PANEL: CPT

## 2025-01-01 PROCEDURE — 96366 THER/PROPH/DIAG IV INF ADDON: CPT

## 2025-01-01 PROCEDURE — 93010 ELECTROCARDIOGRAM REPORT: CPT | Performed by: INTERNAL MEDICINE

## 2025-01-01 PROCEDURE — 31500 INSERT EMERGENCY AIRWAY: CPT

## 2025-01-01 PROCEDURE — 96365 THER/PROPH/DIAG IV INF INIT: CPT

## 2025-01-01 PROCEDURE — 71045 X-RAY EXAM CHEST 1 VIEW: CPT

## 2025-01-01 PROCEDURE — 2700000000 HC OXYGEN THERAPY PER DAY

## 2025-01-01 PROCEDURE — 85379 FIBRIN DEGRADATION QUANT: CPT

## 2025-01-01 PROCEDURE — 85025 COMPLETE CBC W/AUTO DIFF WBC: CPT

## 2025-01-01 PROCEDURE — 82803 BLOOD GASES ANY COMBINATION: CPT

## 2025-01-01 PROCEDURE — 93005 ELECTROCARDIOGRAM TRACING: CPT | Performed by: EMERGENCY MEDICINE

## 2025-01-01 PROCEDURE — 94761 N-INVAS EAR/PLS OXIMETRY MLT: CPT

## 2025-01-01 RX ORDER — NOREPINEPHRINE BITARTRATE 0.06 MG/ML
INJECTION, SOLUTION INTRAVENOUS CONTINUOUS PRN
Status: COMPLETED | OUTPATIENT
Start: 2025-01-01 | End: 2025-01-01

## 2025-01-01 RX ORDER — INDOMETHACIN 25 MG/1
CAPSULE ORAL
Status: DISCONTINUED
Start: 2025-01-01 | End: 2025-01-01 | Stop reason: HOSPADM

## 2025-01-01 RX ORDER — ETOMIDATE 2 MG/ML
INJECTION INTRAVENOUS DAILY PRN
Status: COMPLETED | OUTPATIENT
Start: 2025-01-01 | End: 2025-01-01

## 2025-01-01 RX ORDER — ROCURONIUM BROMIDE 10 MG/ML
INJECTION, SOLUTION INTRAVENOUS DAILY PRN
Status: COMPLETED | OUTPATIENT
Start: 2025-01-01 | End: 2025-01-01

## 2025-01-01 RX ORDER — NOREPINEPHRINE BITARTRATE 0.06 MG/ML
INJECTION, SOLUTION INTRAVENOUS
Status: COMPLETED
Start: 2025-01-01 | End: 2025-01-01

## 2025-01-01 RX ADMIN — ETOMIDATE 30 MG: 2 INJECTION INTRAVENOUS at 13:19

## 2025-01-01 RX ADMIN — Medication 10 MCG/MIN: at 15:20

## 2025-01-01 RX ADMIN — ROCURONIUM BROMIDE 70 MG: 10 INJECTION INTRAVENOUS at 13:20

## 2025-01-01 RX ADMIN — Medication 5 MCG/MIN: at 13:38

## 2025-01-01 ASSESSMENT — PULMONARY FUNCTION TESTS: PIF_VALUE: 21

## 2025-01-08 RX ORDER — ATORVASTATIN CALCIUM 20 MG/1
TABLET, FILM COATED ORAL
Qty: 90 TABLET | Refills: 3 | OUTPATIENT
Start: 2025-01-08

## 2025-01-08 RX ORDER — ATORVASTATIN CALCIUM 20 MG/1
TABLET, FILM COATED ORAL
Qty: 90 TABLET | Refills: 3 | Status: SHIPPED | OUTPATIENT
Start: 2025-01-08

## 2025-01-08 NOTE — TELEPHONE ENCOUNTER
Medication:   Requested Prescriptions     Pending Prescriptions Disp Refills    atorvastatin (LIPITOR) 20 MG tablet 90 tablet 3     Sig: TAKE ONE TABLET BY MOUTH DAILY     Last appt: 6/10/2024   Next appt: Visit date not found

## 2025-04-16 DIAGNOSIS — E11.9 TYPE 2 DIABETES MELLITUS WITHOUT COMPLICATION, WITHOUT LONG-TERM CURRENT USE OF INSULIN: ICD-10-CM

## 2025-04-16 NOTE — TELEPHONE ENCOUNTER
LAST SEEN       NEXT APPOINTMENT       LAST FILL    6/10/24-no return    3/5/25-1 yr return          51.25  1.12.25

## 2025-04-18 RX ORDER — PANTOPRAZOLE SODIUM 40 MG/1
40 TABLET, DELAYED RELEASE ORAL DAILY
Qty: 90 TABLET | Refills: 3 | Status: SHIPPED | OUTPATIENT
Start: 2025-04-18

## 2025-04-23 NOTE — ED PROVIDER NOTES
Intubation    Date/Time: 4/23/2025 3:36 PM    Performed by: Brianna Chandra PA  Authorized by: Aurelio White MD    Consent:     Consent obtained:  Emergent situation  Pre-procedure details:     Patient status:  Unresponsive    Pharmacologic strategy: RSI      Induction agents:  Etomidate    Paralytics:  Rocuronium  Procedure details:     Preoxygenation:  Bag valve mask    CPR in progress: no      Number of attempts:  1  Successful intubation attempt details:     Intubation method:  Oral    Intubation technique: video assisted      Tube size (mm):  7.5    Tube visualized through cords: yes    Placement assessment:     ETT at teeth/gumline (cm):  24    Tube secured with:  ETT palacios    Breath sounds:  Equal    Placement verification: colorimetric ETCO2, CXR verification and direct visualization      CXR findings:  Appropriate position  Post-procedure details:     Procedure completion:  Tolerated         Brianna Chandra PA  04/23/25 1534    
[facility].  CPR was performed in the field and patient had ROSC prior to arrival.  She arrives with an Igel in place.  She did have palpable femoral pulse on arrival.  The Igel was exchanged for an ET tube.  Please see procedure note.  Femoral line was placed by myself for pressure support as she was requiring Levophed.  Patient's oxygen was consistently at 80 to 82% on arrival.  Vent settings were adjusted but oxygen open not go above 82%.  After some discussion with the patient's mother, patient's oxygen level is normally low in the high 80s or low 90s.    Patient found to have elevated lactic acid 8.8 which is likely from her cardiac arrest and hypoxia.  D-dimer elevated as well at 9.77.  Patient's initial troponin was 13 and repeat troponin up trended to 41.  Has no ST elevations on her EKG.  Pregnancy was negative.  INR 2.36 which is consistent with patient taking Coumadin.  Creatinine 1.1 with BUN of 8.  Glucose 300.  CO2 of 15 with anion gap of 26 likely from respiratory failure.  Normal electrolytes.  pH on VBG 7.075 with pCO2 of 60.6 and bicarb of 18.    Will consult with patient's cardiologist, Dr. Isabel to discuss possible transfer to [facility] for further management as this is where patient has had all of her previous cardiac care.    ED Course as of 04/23/25 1619   Wed Apr 23, 2025   1424 Spoke with Dr. Isabel, cardiology at [facility], they discussed patient's cardiac arrest today and possible transfer to the ICU at [facility] for further management as all the patient's care has been at [facility].  He does agree that the patient should be transferred and is going to have the ICU doctor call me back. [DS]   8384 Spoke with Aurelio from [facility] who states that patient had a 43 sec episode of V fib at 9 AM and another 1 in the afternoon that lasted approximately 8 minutes.    Patient also had an episode today of tachycardia and V-fib at 1130 that

## 2025-04-23 NOTE — ED NOTES
Pt presents to the ED via [facility] with c/c of ROSC. Per EMS, pt was found at home pulseless and apneic. Per EMS, pt has significant cardiac hx through [facility]. Pt family member contacted last night by [facility] \"for runs of tachycardia and to be seen first thing in morning.\" EMS gave a total of six rounds of epinephrine, sodium bicarbonate and amiodarone in route. Pt shocked twice for V-fib in route. Per family, pt baseline oxygen saturation usually runs around 88%. Pt has loop recorder in place.